# Patient Record
Sex: FEMALE | Race: WHITE | NOT HISPANIC OR LATINO | Employment: OTHER | URBAN - METROPOLITAN AREA
[De-identification: names, ages, dates, MRNs, and addresses within clinical notes are randomized per-mention and may not be internally consistent; named-entity substitution may affect disease eponyms.]

---

## 2023-03-27 ENCOUNTER — NURSING HOME VISIT (OUTPATIENT)
Dept: POST ACUTE CARE | Facility: EXTERNAL LOCATION | Age: 88
End: 2023-03-27
Payer: COMMERCIAL

## 2023-03-27 DIAGNOSIS — F20.0 PARANOID SCHIZOPHRENIA (MULTI): ICD-10-CM

## 2023-03-27 DIAGNOSIS — H91.93 BILATERAL HEARING LOSS, UNSPECIFIED HEARING LOSS TYPE: ICD-10-CM

## 2023-03-27 DIAGNOSIS — H40.9 GLAUCOMA, UNSPECIFIED GLAUCOMA TYPE, UNSPECIFIED LATERALITY: ICD-10-CM

## 2023-03-27 DIAGNOSIS — E56.9 VITAMIN DEFICIENCY: ICD-10-CM

## 2023-03-27 DIAGNOSIS — E03.9 HYPOTHYROIDISM, UNSPECIFIED TYPE: ICD-10-CM

## 2023-03-27 DIAGNOSIS — Z91.81 HISTORY OF FALL: ICD-10-CM

## 2023-03-27 DIAGNOSIS — E44.0 MODERATE PROTEIN-CALORIE MALNUTRITION (MULTI): ICD-10-CM

## 2023-03-27 PROCEDURE — 99309 SBSQ NF CARE MODERATE MDM 30: CPT | Performed by: INTERNAL MEDICINE

## 2023-03-27 NOTE — LETTER
Patient: Ama Pierce  : 1930    Encounter Date: 2023    Name: Ama Pierce  : 1930  MRN: [id number]  Visit Date: 3/27/2023  Chief Complaint: Monthly visit for management of chronic disease    HPI: 89 y/o female who was admitted to geropsych unit at [facility] for increased delusions, significant weight loss, paranoia disorganization with concern for her safety due to lack of support. She remained on geropsych unit and was closely monitored, meds were adjusted and once she was stable. Psych rec 24 hour supervision, guardianship, supervised medication administration. Therefore, she was transferred to [facility] to reside on secured dementia unit to ensure safety, appropriate medical management.    Subjective: Seen and examined today. Laying in bed. Offers no complaints. Nursing reports no issues. Denies N/V/D/C/CP. No fever or chills. I have reviewed nursing notes since my last visit and document any significant changes Reviewed orders, medications, Labs. Reviewed and updated Interval hx and meds reviewed. Reviewed chart looking at current medications, treatments, labs, x-rays etc.     ROS:  As above in subjective. Otherwise, all other systems have been reviewed and are negative for complaint.    Medications:  Medications reviewed and verified in NH chart.     Allergies:  Ephedrine, Lipitor     Vital Signs: Reviewed in [facility]    Physical Exam:  Constitional: Elderly female pt. awake/alert x1-2, no distress. Very pleasant  Eye: EOMI, clear sclera  ENMT: mucous membranes moist, no apparent injury, no lesions seen  Head/Neck: Neck supple, no apparent injury, thyroid without mass or tenderness. No JVD, trachea midline, no bruits  Respiratory/Thorax: Patent airways, CTAB, normal breath sounds with good chest expansion, thorax symmetric  Cardiovascular: Regular rate and rhythm, no murmurs, 2+ equal pulses of the extremites, normal S1 & S2  Gastrointestional: Nondistended, soft, non tender, no rebound  tenderness or guarding, no masses palpable, no organomegaly, +BS  Genitourinary: Deferred  Musculoskeletal: ROM intact, no joint swelling  Extremities: GARCIA equally, peripheral pulses intact, no edema.   Neurological: Intact senses, motor, response and reflexes, normal strength  Lymphatic: No significant lymphadenopathy  Psychological: Appropriate mood and behavior   Skin: Warm and dry, no lesions, no rashes.    Results/Data:   Lab Results   Component Value Date    WBC 7.7 01/26/2020    HGB 13.1 01/26/2020    HCT 42.1 01/26/2020     01/26/2020    ALT 21 07/23/2018    AST 53 (H) 07/23/2018     01/26/2020    K 3.4 (L) 01/26/2020     01/26/2020    CREATININE 0.92 01/26/2020    BUN 24 (H) 01/26/2020    CO2 27 01/26/2020    TSH 1.06 07/23/2018    INR 1.0 01/26/2020       Provider Impression:   Paranoid Schizophrenia   - reside in secured dementia unit.  - No new issues or behaviors noted or reported.   - Psych is following, med adjustments per them (not on anything currently)    Hypothyroidism  - c/w Synthroid.   - Monitor TSH free T4 q6m and PRN    H/o falls  #s/P fall with left radial fracture in January 2020  - Healed    [place]  - hearing aides in place    Vitamin Deficiency  - c/w supplementation    Glaucoma  - c/w medicated eye drops    Fluid/electrolyte/nutrition - Moderate Protein Calorie Malnutrition  - monitor weight  - continue with daily KcL 10meq  - continue monitor PO intake closely.   - dietician following  - labwork q6mths     Code Status  - DNRCCA    Disposition  - Pt resides on a secured memory care unit.  ----------------  Written by Marci Tan RN acting as a scribe for Dr. Santacruz. This note accurately reflects the work and decisions made by Dr. Santacruz.     I, Dr. Santacruz, attest all medical record entries made by the scribe were under my direction and were personally dictated by me. I have reviewed the chart and agree that the record accurately reflects my performance of the history,  physical exam, and assessment and plan.       Electronically Signed By: Crista Castanon MD   5/10/23  1:28 PM

## 2023-05-04 ENCOUNTER — NURSING HOME VISIT (OUTPATIENT)
Dept: POST ACUTE CARE | Facility: EXTERNAL LOCATION | Age: 88
End: 2023-05-04
Payer: COMMERCIAL

## 2023-05-04 DIAGNOSIS — H40.9 GLAUCOMA, UNSPECIFIED GLAUCOMA TYPE, UNSPECIFIED LATERALITY: ICD-10-CM

## 2023-05-04 DIAGNOSIS — E56.9 VITAMIN DEFICIENCY: ICD-10-CM

## 2023-05-04 DIAGNOSIS — H91.93 BILATERAL HEARING LOSS, UNSPECIFIED HEARING LOSS TYPE: ICD-10-CM

## 2023-05-04 DIAGNOSIS — E44.0 MODERATE PROTEIN-CALORIE MALNUTRITION (MULTI): ICD-10-CM

## 2023-05-04 DIAGNOSIS — E03.9 HYPOTHYROIDISM, UNSPECIFIED TYPE: ICD-10-CM

## 2023-05-04 DIAGNOSIS — Z91.81 HISTORY OF FALL: ICD-10-CM

## 2023-05-04 DIAGNOSIS — F20.0 PARANOID SCHIZOPHRENIA (MULTI): ICD-10-CM

## 2023-05-04 PROCEDURE — 99308 SBSQ NF CARE LOW MDM 20: CPT | Performed by: INTERNAL MEDICINE

## 2023-05-04 NOTE — LETTER
Patient: Ama Pierce  : 1930    Encounter Date: 2023    Name: Ama Pierce  : 1930  MRN: [id number]  Visit Date: 2023  Chief Complaint: Monthly visit for management of chronic disease    HPI: 89 y/o female who was admitted to geropsych unit at [facility] for increased delusions, significant weight loss, paranoia disorganization with concern for her safety due to lack of support. She remained on geropsych unit and was closely monitored, meds were adjusted and once she was stable. Psych rec 24 hour supervision, guardianship, supervised medication administration. Therefore, she was transferred to [facility] to reside on secured dementia unit to ensure safety, appropriate medical management.    Subjective: Seen and examined today. Laying in bed, sleeping. Arousable. Offers no complaints. Nursing reports no issues. Denies N/V/D/C/CP. No fever or chills. I have reviewed nursing notes since my last visit and document any significant changes Reviewed orders, medications, Labs. Reviewed and updated Interval hx and meds reviewed. Reviewed chart looking at current medications, treatments, labs, x-rays etc.     ROS:  As above in subjective. Otherwise, all other systems have been reviewed and are negative for complaint.    Medications:  Medications reviewed and verified in NH chart.     Allergies:  Ephedrine, Lipitor     Vital Signs: Reviewed in [facility]    Physical Exam:  Constitional: Elderly female pt. awake/alert x1-2, no distress. Very pleasant  Eye: EOMI, clear sclera  ENMT: mucous membranes moist, no apparent injury, no lesions seen  Head/Neck: Neck supple, no apparent injury, thyroid without mass or tenderness. No JVD, trachea midline, no bruits  Respiratory/Thorax: Patent airways, CTAB, normal breath sounds with good chest expansion, thorax symmetric  Cardiovascular: Regular rate and rhythm, no murmurs, 2+ equal pulses of the extremites, normal S1 & S2  Gastrointestional: Nondistended, soft, non  tender, no rebound tenderness or guarding, no masses palpable, no organomegaly, +BS  Genitourinary: Deferred  Musculoskeletal: ROM intact, no joint swelling  Extremities: GARCIA equally, peripheral pulses intact, no edema.   Neurological: Intact senses, motor, response and reflexes, normal strength  Lymphatic: No significant lymphadenopathy  Psychological: Appropriate mood and behavior   Skin: Warm and dry, no lesions, no rashes.    Results/Data:   Lab Results   Component Value Date    WBC 7.7 01/26/2020    HGB 13.1 01/26/2020    HCT 42.1 01/26/2020     01/26/2020    ALT 21 07/23/2018    AST 53 (H) 07/23/2018     01/26/2020    K 3.4 (L) 01/26/2020     01/26/2020    CREATININE 0.92 01/26/2020    BUN 24 (H) 01/26/2020    CO2 27 01/26/2020    TSH 1.06 07/23/2018    INR 1.0 01/26/2020       Provider Impression:   Paranoid Schizophrenia   - reside in secured dementia unit.  - No new issues or behaviors noted or reported.   - Psych is following, med adjustments per them (not on anything currently)    Hypothyroidism  - c/w Synthroid.   - Monitor TSH free T4 q6m and PRN    H/o falls  #s/P fall with left radial fracture in January 2020  - Healed    [place]  - hearing aides in place    Vitamin Deficiency  - c/w supplementation    Glaucoma  - c/w medicated eye drops    Fluid/electrolyte/nutrition - Moderate Protein Calorie Malnutrition  - monitor weight  - continue with daily KcL 10meq  - continue monitor PO intake closely.   - dietician following  - labwork q6mths     Code Status  - DNRCCA    Disposition  - Pt resides on a secured memory care unit.  ----------------  Written by Marci Tan RN acting as a scribe for Dr. Santacruz. This note accurately reflects the work and decisions made by Dr. Santacruz.     I, Dr. Santacruz, attest all medical record entries made by the scribe were under my direction and were personally dictated by me. I have reviewed the chart and agree that the record accurately reflects my performance of  the history, physical exam, and assessment and plan.     Electronically Signed By: Crista Castanon MD   5/16/23  9:04 AM

## 2023-05-10 PROBLEM — F20.0 PARANOID SCHIZOPHRENIA (MULTI): Status: ACTIVE | Noted: 2023-05-10

## 2023-05-10 PROBLEM — H91.93 BILATERAL HEARING LOSS: Status: ACTIVE | Noted: 2023-05-10

## 2023-05-10 PROBLEM — E56.9 VITAMIN DEFICIENCY: Status: ACTIVE | Noted: 2023-05-10

## 2023-05-10 PROBLEM — E03.9 HYPOTHYROIDISM: Status: ACTIVE | Noted: 2023-05-10

## 2023-05-10 PROBLEM — H40.9 GLAUCOMA: Status: ACTIVE | Noted: 2023-05-10

## 2023-05-10 PROBLEM — Z91.81 HISTORY OF FALL: Status: ACTIVE | Noted: 2023-05-10

## 2023-05-10 PROBLEM — E44.0 MODERATE PROTEIN-CALORIE MALNUTRITION (MULTI): Status: ACTIVE | Noted: 2023-05-10

## 2023-05-10 NOTE — PROGRESS NOTES
Name: Ama Pierce  : 1930  MRN: [id number]  Visit Date: 3/27/2023  Chief Complaint: Monthly visit for management of chronic disease    HPI: 89 y/o female who was admitted to geropsych unit at [facility] for increased delusions, significant weight loss, paranoia disorganization with concern for her safety due to lack of support. She remained on geropsych unit and was closely monitored, meds were adjusted and once she was stable. Psych rec 24 hour supervision, guardianship, supervised medication administration. Therefore, she was transferred to [facility] to reside on secured dementia unit to ensure safety, appropriate medical management.    Subjective: Seen and examined today. Laying in bed. Offers no complaints. Nursing reports no issues. Denies N/V/D/C/CP. No fever or chills. I have reviewed nursing notes since my last visit and document any significant changes Reviewed orders, medications, Labs. Reviewed and updated Interval hx and meds reviewed. Reviewed chart looking at current medications, treatments, labs, x-rays etc.     ROS:  As above in subjective. Otherwise, all other systems have been reviewed and are negative for complaint.    Medications:  Medications reviewed and verified in NH chart.     Allergies:  Ephedrine, Lipitor     Vital Signs: Reviewed in [facility]    Physical Exam:  Constitional: Elderly female pt. awake/alert x1-2, no distress. Very pleasant  Eye: EOMI, clear sclera  ENMT: mucous membranes moist, no apparent injury, no lesions seen  Head/Neck: Neck supple, no apparent injury, thyroid without mass or tenderness. No JVD, trachea midline, no bruits  Respiratory/Thorax: Patent airways, CTAB, normal breath sounds with good chest expansion, thorax symmetric  Cardiovascular: Regular rate and rhythm, no murmurs, 2+ equal pulses of the extremites, normal S1 & S2  Gastrointestional: Nondistended, soft, non tender, no rebound tenderness or guarding, no masses palpable, no organomegaly,  +BS  Genitourinary: Deferred  Musculoskeletal: ROM intact, no joint swelling  Extremities: GARCIA equally, peripheral pulses intact, no edema.   Neurological: Intact senses, motor, response and reflexes, normal strength  Lymphatic: No significant lymphadenopathy  Psychological: Appropriate mood and behavior   Skin: Warm and dry, no lesions, no rashes.    Results/Data:   Lab Results   Component Value Date    WBC 7.7 01/26/2020    HGB 13.1 01/26/2020    HCT 42.1 01/26/2020     01/26/2020    ALT 21 07/23/2018    AST 53 (H) 07/23/2018     01/26/2020    K 3.4 (L) 01/26/2020     01/26/2020    CREATININE 0.92 01/26/2020    BUN 24 (H) 01/26/2020    CO2 27 01/26/2020    TSH 1.06 07/23/2018    INR 1.0 01/26/2020       Provider Impression:   Paranoid Schizophrenia   - reside in secured dementia unit.  - No new issues or behaviors noted or reported.   - Psych is following, med adjustments per them (not on anything currently)    Hypothyroidism  - c/w Synthroid.   - Monitor TSH free T4 q6m and PRN    H/o falls  #s/P fall with left radial fracture in January 2020  - Healed    [place]  - hearing aides in place    Vitamin Deficiency  - c/w supplementation    Glaucoma  - c/w medicated eye drops    Fluid/electrolyte/nutrition - Moderate Protein Calorie Malnutrition  - monitor weight  - continue with daily KcL 10meq  - continue monitor PO intake closely.   - dietician following  - labwork q6mths     Code Status  - DNRCCA    Disposition  - Pt resides on a secured memory care unit.  ----------------  Written by Marci Tan RN acting as a scribe for Dr. Santacruz. This note accurately reflects the work and decisions made by Dr. Santacruz.     I, Dr. Santacruz, attest all medical record entries made by the scribe were under my direction and were personally dictated by me. I have reviewed the chart and agree that the record accurately reflects my performance of the history, physical exam, and assessment and plan.

## 2023-05-15 NOTE — PROGRESS NOTES
Name: Ama Pierce  : 1930  MRN: [id number]  Visit Date: 2023  Chief Complaint: Monthly visit for management of chronic disease    HPI: 89 y/o female who was admitted to geropsych unit at [facility] for increased delusions, significant weight loss, paranoia disorganization with concern for her safety due to lack of support. She remained on geropsych unit and was closely monitored, meds were adjusted and once she was stable. Psych rec 24 hour supervision, guardianship, supervised medication administration. Therefore, she was transferred to [facility] to reside on secured dementia unit to ensure safety, appropriate medical management.    Subjective: Seen and examined today. Laying in bed, sleeping. Arousable. Offers no complaints. Nursing reports no issues. Denies N/V/D/C/CP. No fever or chills. I have reviewed nursing notes since my last visit and document any significant changes Reviewed orders, medications, Labs. Reviewed and updated Interval hx and meds reviewed. Reviewed chart looking at current medications, treatments, labs, x-rays etc.     ROS:  As above in subjective. Otherwise, all other systems have been reviewed and are negative for complaint.    Medications:  Medications reviewed and verified in NH chart.     Allergies:  Ephedrine, Lipitor     Vital Signs: Reviewed in [facility]    Physical Exam:  Constitional: Elderly female pt. awake/alert x1-2, no distress. Very pleasant  Eye: EOMI, clear sclera  ENMT: mucous membranes moist, no apparent injury, no lesions seen  Head/Neck: Neck supple, no apparent injury, thyroid without mass or tenderness. No JVD, trachea midline, no bruits  Respiratory/Thorax: Patent airways, CTAB, normal breath sounds with good chest expansion, thorax symmetric  Cardiovascular: Regular rate and rhythm, no murmurs, 2+ equal pulses of the extremites, normal S1 & S2  Gastrointestional: Nondistended, soft, non tender, no rebound tenderness or guarding, no masses palpable, no  organomegaly, +BS  Genitourinary: Deferred  Musculoskeletal: ROM intact, no joint swelling  Extremities: GARCIA equally, peripheral pulses intact, no edema.   Neurological: Intact senses, motor, response and reflexes, normal strength  Lymphatic: No significant lymphadenopathy  Psychological: Appropriate mood and behavior   Skin: Warm and dry, no lesions, no rashes.    Results/Data:   Lab Results   Component Value Date    WBC 7.7 01/26/2020    HGB 13.1 01/26/2020    HCT 42.1 01/26/2020     01/26/2020    ALT 21 07/23/2018    AST 53 (H) 07/23/2018     01/26/2020    K 3.4 (L) 01/26/2020     01/26/2020    CREATININE 0.92 01/26/2020    BUN 24 (H) 01/26/2020    CO2 27 01/26/2020    TSH 1.06 07/23/2018    INR 1.0 01/26/2020       Provider Impression:   Paranoid Schizophrenia   - reside in secured dementia unit.  - No new issues or behaviors noted or reported.   - Psych is following, med adjustments per them (not on anything currently)    Hypothyroidism  - c/w Synthroid.   - Monitor TSH free T4 q6m and PRN    H/o falls  #s/P fall with left radial fracture in January 2020  - Healed    [place]  - hearing aides in place    Vitamin Deficiency  - c/w supplementation    Glaucoma  - c/w medicated eye drops    Fluid/electrolyte/nutrition - Moderate Protein Calorie Malnutrition  - monitor weight  - continue with daily KcL 10meq  - continue monitor PO intake closely.   - dietician following  - labwork q6mths     Code Status  - DNRCCA    Disposition  - Pt resides on a secured memory care unit.  ----------------  Written by Marci Tan RN acting as a scribe for Dr. Santacruz. This note accurately reflects the work and decisions made by Dr. Santacruz.     I, Dr. Santacruz, attest all medical record entries made by the scribe were under my direction and were personally dictated by me. I have reviewed the chart and agree that the record accurately reflects my performance of the history, physical exam, and assessment and plan.

## 2023-07-15 ENCOUNTER — NURSING HOME VISIT (OUTPATIENT)
Dept: POST ACUTE CARE | Facility: EXTERNAL LOCATION | Age: 88
End: 2023-07-15
Payer: COMMERCIAL

## 2023-07-15 DIAGNOSIS — E56.9 VITAMIN DEFICIENCY: ICD-10-CM

## 2023-07-15 DIAGNOSIS — H40.9 GLAUCOMA, UNSPECIFIED GLAUCOMA TYPE, UNSPECIFIED LATERALITY: ICD-10-CM

## 2023-07-15 DIAGNOSIS — E03.9 HYPOTHYROIDISM, UNSPECIFIED TYPE: ICD-10-CM

## 2023-07-15 DIAGNOSIS — E44.0 MODERATE PROTEIN-CALORIE MALNUTRITION (MULTI): ICD-10-CM

## 2023-07-15 DIAGNOSIS — F20.0 PARANOID SCHIZOPHRENIA (MULTI): ICD-10-CM

## 2023-07-15 DIAGNOSIS — Z91.81 HISTORY OF FALL: ICD-10-CM

## 2023-07-15 DIAGNOSIS — H91.93 BILATERAL HEARING LOSS, UNSPECIFIED HEARING LOSS TYPE: ICD-10-CM

## 2023-07-15 PROCEDURE — 99308 SBSQ NF CARE LOW MDM 20: CPT | Performed by: INTERNAL MEDICINE

## 2023-07-15 NOTE — LETTER
Patient: Ama Pierce  : 1930    Encounter Date: 07/15/2023    Name: Ama Pierce  : 1930  MRN: [id number]  Visit Date: 7/15/2023  Chief Complaint: Monthly visit for management of chronic disease    HPI: 89 y/o female who was admitted to geropsych unit at [facility] for increased delusions, significant weight loss, paranoia disorganization with concern for her safety due to lack of support. She remained on geropsych unit and was closely monitored, meds were adjusted and once she was stable. Psych rec 24 hour supervision, guardianship, supervised medication administration. Therefore, she was transferred to [facility] to reside on secured dementia unit to ensure safety, appropriate medical management.    Subjective: Seen and examined today. Sitting up in chair in the common area. Appears happy to see me. Offers no complaints. Nursing reports no issues. Denies N/V/D/C/CP. No fever or chills. I have reviewed nursing notes since my last visit and document any significant changes Reviewed orders, medications, Labs. Reviewed and updated Interval hx and meds reviewed. Reviewed chart looking at current medications, treatments, labs, x-rays etc.     ROS:  As above in subjective. Otherwise, all other systems have been reviewed and are negative for complaint.    Medications:  Medications reviewed and verified in NH chart.     Allergies:  Ephedrine, Lipitor     Vital Signs: Reviewed in [facility]    Physical Exam:  Constitional: Elderly female pt. awake/alert x1-2, no distress. Very pleasant  Eye: EOMI, clear sclera  ENMT: mucous membranes moist, no apparent injury, no lesions seen  Head/Neck: Neck supple, no apparent injury, thyroid without mass or tenderness. No JVD, trachea midline, no bruits  Respiratory/Thorax: Patent airways, CTAB, normal breath sounds with good chest expansion, thorax symmetric  Cardiovascular: Regular rate and rhythm, no murmurs, 2+ equal pulses of the extremites, normal S1 &  S2  Gastrointestional: Nondistended, soft, non tender, no rebound tenderness or guarding, no masses palpable, no organomegaly, +BS  Genitourinary: Deferred  Musculoskeletal: ROM intact, no joint swelling  Extremities: GARCIA equally, peripheral pulses intact, no edema.   Neurological: Intact senses, motor, response and reflexes, normal strength  Lymphatic: No significant lymphadenopathy  Psychological: Appropriate mood and behavior   Skin: Warm and dry, no lesions, no rashes.    Results/Data:   Lab Results   Component Value Date    WBC 7.7 01/26/2020    HGB 13.1 01/26/2020    HCT 42.1 01/26/2020     01/26/2020    ALT 21 07/23/2018    AST 53 (H) 07/23/2018     01/26/2020    K 3.4 (L) 01/26/2020     01/26/2020    CREATININE 0.92 01/26/2020    BUN 24 (H) 01/26/2020    CO2 27 01/26/2020    TSH 1.06 07/23/2018    INR 1.0 01/26/2020       Provider Impression:   Paranoid Schizophrenia   - reside in secured dementia unit.  - No new issues or behaviors noted or reported.   - Psych is following, med adjustments per them (not on anything currently)    Hypothyroidism  - c/w Synthroid.   - Monitor TSH free T4 q6m and PRN    H/o falls  #s/P fall with left radial fracture in January 2020  - Healed    [place]  - hearing aides in place    Vitamin Deficiency  - c/w supplementation    Glaucoma  - c/w medicated eye drops    Fluid/electrolyte/nutrition - Moderate Protein Calorie Malnutrition  - monitor weight  - continue with daily KcL 10meq  - continue monitor PO intake closely.   - dietician following  - labwork q6mths     Code Status  - DNRCCA    Disposition  - Pt resides on a secured memory care unit.  ----------------  Written by Marci Tan RN acting as a scribe for Dr. Santacruz. This note accurately reflects the work and decisions made by Dr. Santacruz.     I, Dr. Santacruz, attest all medical record entries made by the scribe were under my direction and were personally dictated by me. I have reviewed the chart and agree that  the record accurately reflects my performance of the history, physical exam, and assessment and plan.     Electronically Signed By: Crista Castanon MD   9/6/23  2:45 PM

## 2023-09-06 PROBLEM — Z78.9 NURSING HOME RESIDENT: Status: ACTIVE | Noted: 2023-09-06

## 2023-09-06 NOTE — PROGRESS NOTES
Name: Ama Pierce  : 1930  MRN: [id number]  Visit Date: 7/15/2023  Chief Complaint: Monthly visit for management of chronic disease    HPI: 89 y/o female who was admitted to geropsych unit at [facility] for increased delusions, significant weight loss, paranoia disorganization with concern for her safety due to lack of support. She remained on geropsych unit and was closely monitored, meds were adjusted and once she was stable. Psych rec 24 hour supervision, guardianship, supervised medication administration. Therefore, she was transferred to [facility] to reside on secured dementia unit to ensure safety, appropriate medical management.    Subjective: Seen and examined today. Sitting up in chair in the common area. Appears happy to see me. Offers no complaints. Nursing reports no issues. Denies N/V/D/C/CP. No fever or chills. I have reviewed nursing notes since my last visit and document any significant changes Reviewed orders, medications, Labs. Reviewed and updated Interval hx and meds reviewed. Reviewed chart looking at current medications, treatments, labs, x-rays etc.     ROS:  As above in subjective. Otherwise, all other systems have been reviewed and are negative for complaint.    Medications:  Medications reviewed and verified in NH chart.     Allergies:  Ephedrine, Lipitor     Vital Signs: Reviewed in [facility]    Physical Exam:  Constitional: Elderly female pt. awake/alert x1-2, no distress. Very pleasant  Eye: EOMI, clear sclera  ENMT: mucous membranes moist, no apparent injury, no lesions seen  Head/Neck: Neck supple, no apparent injury, thyroid without mass or tenderness. No JVD, trachea midline, no bruits  Respiratory/Thorax: Patent airways, CTAB, normal breath sounds with good chest expansion, thorax symmetric  Cardiovascular: Regular rate and rhythm, no murmurs, 2+ equal pulses of the extremites, normal S1 & S2  Gastrointestional: Nondistended, soft, non tender, no rebound tenderness or  guarding, no masses palpable, no organomegaly, +BS  Genitourinary: Deferred  Musculoskeletal: ROM intact, no joint swelling  Extremities: GARCIA equally, peripheral pulses intact, no edema.   Neurological: Intact senses, motor, response and reflexes, normal strength  Lymphatic: No significant lymphadenopathy  Psychological: Appropriate mood and behavior   Skin: Warm and dry, no lesions, no rashes.    Results/Data:   Lab Results   Component Value Date    WBC 7.7 01/26/2020    HGB 13.1 01/26/2020    HCT 42.1 01/26/2020     01/26/2020    ALT 21 07/23/2018    AST 53 (H) 07/23/2018     01/26/2020    K 3.4 (L) 01/26/2020     01/26/2020    CREATININE 0.92 01/26/2020    BUN 24 (H) 01/26/2020    CO2 27 01/26/2020    TSH 1.06 07/23/2018    INR 1.0 01/26/2020       Provider Impression:   Paranoid Schizophrenia   - reside in secured dementia unit.  - No new issues or behaviors noted or reported.   - Psych is following, med adjustments per them (not on anything currently)    Hypothyroidism  - c/w Synthroid.   - Monitor TSH free T4 q6m and PRN    H/o falls  #s/P fall with left radial fracture in January 2020  - Healed    [place]  - hearing aides in place    Vitamin Deficiency  - c/w supplementation    Glaucoma  - c/w medicated eye drops    Fluid/electrolyte/nutrition - Moderate Protein Calorie Malnutrition  - monitor weight  - continue with daily KcL 10meq  - continue monitor PO intake closely.   - dietician following  - labwork q6mths     Code Status  - DNRCCA    Disposition  - Pt resides on a secured memory care unit.  ----------------  Written by Marci Tan RN acting as a scribe for Dr. Santacruz. This note accurately reflects the work and decisions made by Dr. Santacruz.     I, Dr. Santacruz, attest all medical record entries made by the scribe were under my direction and were personally dictated by me. I have reviewed the chart and agree that the record accurately reflects my performance of the history, physical exam, and  assessment and plan.

## 2023-09-09 ENCOUNTER — NURSING HOME VISIT (OUTPATIENT)
Dept: POST ACUTE CARE | Facility: EXTERNAL LOCATION | Age: 88
End: 2023-09-09
Payer: COMMERCIAL

## 2023-09-09 DIAGNOSIS — H91.93 BILATERAL HEARING LOSS, UNSPECIFIED HEARING LOSS TYPE: ICD-10-CM

## 2023-09-09 DIAGNOSIS — E03.9 HYPOTHYROIDISM, UNSPECIFIED TYPE: ICD-10-CM

## 2023-09-09 DIAGNOSIS — E56.9 VITAMIN DEFICIENCY: ICD-10-CM

## 2023-09-09 DIAGNOSIS — E44.0 MODERATE PROTEIN-CALORIE MALNUTRITION (MULTI): ICD-10-CM

## 2023-09-09 DIAGNOSIS — H40.9 GLAUCOMA, UNSPECIFIED GLAUCOMA TYPE, UNSPECIFIED LATERALITY: ICD-10-CM

## 2023-09-09 DIAGNOSIS — F20.0 PARANOID SCHIZOPHRENIA (MULTI): ICD-10-CM

## 2023-09-09 DIAGNOSIS — Z91.81 HISTORY OF FALL: ICD-10-CM

## 2023-09-09 PROCEDURE — 99308 SBSQ NF CARE LOW MDM 20: CPT | Performed by: INTERNAL MEDICINE

## 2023-09-09 NOTE — LETTER
Patient: Ama Pierce  : 1930    Encounter Date: 2023    Name: Ama Pierce  : 1930  MRN: [id number]  Visit Date: 2023  Chief Complaint: Monthly visit for management of chronic disease    HPI: 89 y/o female who was admitted to geropsych unit at [facility] for increased delusions, significant weight loss, paranoia disorganization with concern for her safety due to lack of support. She remained on geropsych unit and was closely monitored, meds were adjusted and once she was stable. Psych rec 24 hour supervision, guardianship, supervised medication administration. Therefore, she was transferred to [facility] to reside on secured dementia unit to ensure safety, appropriate medical management.    Subjective: Seen and examined today. Sitting up in chair in the common area. Offers no complaints. Nursing reports no issues. Denies N/V/D/C/CP. No fever or chills. I have reviewed nursing notes since my last visit and document any significant changes Reviewed orders, medications, Labs. Reviewed and updated Interval hx and meds reviewed. Reviewed chart looking at current medications, treatments, labs, x-rays etc.     ROS:  As above in subjective. Otherwise, all other systems have been reviewed and are negative for complaint.    Medications:  Medications reviewed and verified in NH chart.     Vital Signs: Reviewed in [facility]    Physical Exam:  Constitional: Elderly female pt. awake/alert x1-2, no distress. Very pleasant  Eye: EOMI, clear sclera  ENMT: mucous membranes moist, no apparent injury, no lesions seen  Head/Neck: Neck supple, no apparent injury, thyroid without mass or tenderness. No JVD, trachea midline, no bruits  Respiratory/Thorax: Patent airways, CTAB, normal breath sounds with good chest expansion, thorax symmetric  Cardiovascular: Regular rate and rhythm, no murmurs, 2+ equal pulses of the extremites, normal S1 & S2  Gastrointestional: Nondistended, soft, non tender, no rebound tenderness or  guarding, no masses palpable, no organomegaly, +BS  Genitourinary: Deferred  Musculoskeletal: ROM intact, no joint swelling  Extremities: GARCIA equally, peripheral pulses intact, no edema.   Neurological: Intact senses, motor, response and reflexes, normal strength  Lymphatic: No significant lymphadenopathy  Psychological: Appropriate mood and behavior   Skin: Warm and dry, no lesions, no rashes.    Results/Data:   Lab Results   Component Value Date    WBC 7.7 01/26/2020    HGB 13.1 01/26/2020    HCT 42.1 01/26/2020     01/26/2020    ALT 21 07/23/2018    AST 53 (H) 07/23/2018     01/26/2020    K 3.4 (L) 01/26/2020     01/26/2020    CREATININE 0.92 01/26/2020    BUN 24 (H) 01/26/2020    CO2 27 01/26/2020    TSH 1.06 07/23/2018    INR 1.0 01/26/2020       Provider Impression:   Paranoid Schizophrenia   - reside in secured dementia unit.  - No new issues or behaviors noted or reported.   - Psych is following, med adjustments per them (not on anything currently)    Hypothyroidism  - c/w Synthroid.   - Monitor TSH free T4 q6m and PRN    H/o falls  #s/P fall with left radial fracture in January 2020  - Healed    [place]  - hearing aides in place    Vitamin Deficiency  - c/w supplementation    Glaucoma  - c/w medicated eye drops    Fluid/electrolyte/nutrition - Moderate Protein Calorie Malnutrition  - monitor weight  - continue with daily KcL 10meq  - continue monitor PO intake closely.   - dietician following  - labwork q6mths     Code Status  - DNRCCA    Disposition  - Pt resides on a secured memory care unit.  ----------------  Written by Marci Tan RN acting as a scribe for Dr. Santacruz. This note accurately reflects the work and decisions made by Dr. Santacruz.     I, Dr. Santacruz, attest all medical record entries made by the scribe were under my direction and were personally dictated by me. I have reviewed the chart and agree that the record accurately reflects my performance of the history, physical exam, and  assessment and plan.       Electronically Signed By: Crista Castanon MD   1/5/24  1:00 PM

## 2023-11-04 ENCOUNTER — NURSING HOME VISIT (OUTPATIENT)
Dept: POST ACUTE CARE | Facility: EXTERNAL LOCATION | Age: 88
End: 2023-11-04
Payer: COMMERCIAL

## 2023-11-04 DIAGNOSIS — H40.9 GLAUCOMA, UNSPECIFIED GLAUCOMA TYPE, UNSPECIFIED LATERALITY: ICD-10-CM

## 2023-11-04 DIAGNOSIS — H91.93 BILATERAL HEARING LOSS, UNSPECIFIED HEARING LOSS TYPE: ICD-10-CM

## 2023-11-04 DIAGNOSIS — E56.9 VITAMIN DEFICIENCY: ICD-10-CM

## 2023-11-04 DIAGNOSIS — F20.0 PARANOID SCHIZOPHRENIA (MULTI): ICD-10-CM

## 2023-11-04 DIAGNOSIS — Z91.81 HISTORY OF FALL: ICD-10-CM

## 2023-11-04 DIAGNOSIS — E03.9 HYPOTHYROIDISM, UNSPECIFIED TYPE: ICD-10-CM

## 2023-11-04 DIAGNOSIS — E44.0 MODERATE PROTEIN-CALORIE MALNUTRITION (MULTI): ICD-10-CM

## 2023-11-04 PROCEDURE — 99308 SBSQ NF CARE LOW MDM 20: CPT | Performed by: INTERNAL MEDICINE

## 2023-11-04 NOTE — LETTER
Patient: Ama Pierce  : 1930    Encounter Date: 2023    Name: Ama Pierce  : 1930  MRN: [id number]  Visit Date: 2023  Chief Complaint: Monthly visit for management of chronic disease    HPI: 89 y/o female who was admitted to geropsych unit at [facility] for increased delusions, significant weight loss, paranoia disorganization with concern for her safety due to lack of support. She remained on geropsych unit and was closely monitored, meds were adjusted and once she was stable. Psych rec 24 hour supervision, guardianship, supervised medication administration. Therefore, she was transferred to [facility] to reside on secured dementia unit to ensure safety, appropriate medical management.    Subjective: Seen and examined today. Sitting up in chair in the common area. Offers no complaints. Nursing reports no issues. Denies N/V/D/C/CP. No fever or chills. I have reviewed nursing notes since my last visit and document any significant changes Reviewed orders, medications, Labs. Reviewed and updated Interval hx and meds reviewed. Reviewed chart looking at current medications, treatments, labs, x-rays etc.     ROS:  As above in subjective. Otherwise, all other systems have been reviewed and are negative for complaint.    Medications:  Medications reviewed and verified in NH chart.     Vital Signs: Reviewed in [facility]    Physical Exam:  Constitional: Elderly female pt. awake/alert x1-2, no distress. Very pleasant  Eye: EOMI, clear sclera  ENMT: mucous membranes moist, no apparent injury, no lesions seen  Head/Neck: Neck supple, no apparent injury, thyroid without mass or tenderness. No JVD, trachea midline, no bruits  Respiratory/Thorax: Patent airways, CTAB, normal breath sounds with good chest expansion, thorax symmetric  Cardiovascular: Regular rate and rhythm, no murmurs, 2+ equal pulses of the extremites, normal S1 & S2  Gastrointestional: Nondistended, soft, non tender, no rebound tenderness  or guarding, no masses palpable, no organomegaly, +BS  Genitourinary: Deferred  Musculoskeletal: ROM intact, no joint swelling  Extremities: GARCIA equally, peripheral pulses intact, no edema.   Neurological: Intact senses, motor, response and reflexes, normal strength  Lymphatic: No significant lymphadenopathy  Psychological: Appropriate mood and behavior   Skin: Warm and dry, no lesions, no rashes.    Results/Data:   Lab Results   Component Value Date    WBC 7.7 01/26/2020    HGB 13.1 01/26/2020    HCT 42.1 01/26/2020     01/26/2020    ALT 21 07/23/2018    AST 53 (H) 07/23/2018     01/26/2020    K 3.4 (L) 01/26/2020     01/26/2020    CREATININE 0.92 01/26/2020    BUN 24 (H) 01/26/2020    CO2 27 01/26/2020    TSH 1.06 07/23/2018    INR 1.0 01/26/2020       Provider Impression:   Paranoid Schizophrenia   - reside in secured dementia unit.  - No new issues or behaviors noted or reported.   - Psych is following, med adjustments per them (not on anything currently)    Hypothyroidism  - c/w Synthroid.   - Monitor TSH free T4 q6m and PRN    H/o falls  #s/P fall with left radial fracture in January 2020  - Healed    [place]  - hearing aides in place    Vitamin Deficiency  - c/w supplementation    Glaucoma  - c/w medicated eye drops    Fluid/electrolyte/nutrition - Moderate Protein Calorie Malnutrition  - monitor weight  - continue with daily KcL 10meq  - continue monitor PO intake closely.   - dietician following  - labwork q6mths     Code Status  - DNRCCA    Disposition  - Pt resides on a secured memory care unit.  ----------------  Written by Marci Tan RN acting as a scribe for Dr. Santacruz. This note accurately reflects the work and decisions made by Dr. Santacruz.     I, Dr. Santacruz, attest all medical record entries made by the scribe were under my direction and were personally dictated by me. I have reviewed the chart and agree that the record accurately reflects my performance of the history, physical exam,  and assessment and plan.     Electronically Signed By: Crista Castanon MD   1/10/24 11:25 AM

## 2024-01-03 NOTE — PROGRESS NOTES
Name: Ama Pierce  : 1930  MRN: [id number]  Visit Date: 2023  Chief Complaint: Monthly visit for management of chronic disease    HPI: 89 y/o female who was admitted to geropsych unit at [facility] for increased delusions, significant weight loss, paranoia disorganization with concern for her safety due to lack of support. She remained on geropsych unit and was closely monitored, meds were adjusted and once she was stable. Psych rec 24 hour supervision, guardianship, supervised medication administration. Therefore, she was transferred to [facility] to reside on secured dementia unit to ensure safety, appropriate medical management.    Subjective: Seen and examined today. Sitting up in chair in the common area. Offers no complaints. Nursing reports no issues. Denies N/V/D/C/CP. No fever or chills. I have reviewed nursing notes since my last visit and document any significant changes Reviewed orders, medications, Labs. Reviewed and updated Interval hx and meds reviewed. Reviewed chart looking at current medications, treatments, labs, x-rays etc.     ROS:  As above in subjective. Otherwise, all other systems have been reviewed and are negative for complaint.    Medications:  Medications reviewed and verified in NH chart.     Vital Signs: Reviewed in [facility]    Physical Exam:  Constitional: Elderly female pt. awake/alert x1-2, no distress. Very pleasant  Eye: EOMI, clear sclera  ENMT: mucous membranes moist, no apparent injury, no lesions seen  Head/Neck: Neck supple, no apparent injury, thyroid without mass or tenderness. No JVD, trachea midline, no bruits  Respiratory/Thorax: Patent airways, CTAB, normal breath sounds with good chest expansion, thorax symmetric  Cardiovascular: Regular rate and rhythm, no murmurs, 2+ equal pulses of the extremites, normal S1 & S2  Gastrointestional: Nondistended, soft, non tender, no rebound tenderness or guarding, no masses palpable, no organomegaly, +BS  Genitourinary:  Deferred  Musculoskeletal: ROM intact, no joint swelling  Extremities: GARCIA equally, peripheral pulses intact, no edema.   Neurological: Intact senses, motor, response and reflexes, normal strength  Lymphatic: No significant lymphadenopathy  Psychological: Appropriate mood and behavior   Skin: Warm and dry, no lesions, no rashes.    Results/Data:   Lab Results   Component Value Date    WBC 7.7 01/26/2020    HGB 13.1 01/26/2020    HCT 42.1 01/26/2020     01/26/2020    ALT 21 07/23/2018    AST 53 (H) 07/23/2018     01/26/2020    K 3.4 (L) 01/26/2020     01/26/2020    CREATININE 0.92 01/26/2020    BUN 24 (H) 01/26/2020    CO2 27 01/26/2020    TSH 1.06 07/23/2018    INR 1.0 01/26/2020       Provider Impression:   Paranoid Schizophrenia   - reside in secured dementia unit.  - No new issues or behaviors noted or reported.   - Psych is following, med adjustments per them (not on anything currently)    Hypothyroidism  - c/w Synthroid.   - Monitor TSH free T4 q6m and PRN    H/o falls  #s/P fall with left radial fracture in January 2020  - Healed    [place]  - hearing aides in place    Vitamin Deficiency  - c/w supplementation    Glaucoma  - c/w medicated eye drops    Fluid/electrolyte/nutrition - Moderate Protein Calorie Malnutrition  - monitor weight  - continue with daily KcL 10meq  - continue monitor PO intake closely.   - dietician following  - labwork q6mths     Code Status  - DNRCCA    Disposition  - Pt resides on a secured memory care unit.  ----------------  Written by Marci Tan RN acting as a scribe for Dr. Santacruz. This note accurately reflects the work and decisions made by Dr. Santacruz.     I, Dr. Santacruz, attest all medical record entries made by the scribe were under my direction and were personally dictated by me. I have reviewed the chart and agree that the record accurately reflects my performance of the history, physical exam, and assessment and plan.

## 2024-01-09 NOTE — PROGRESS NOTES
Name: Ama Pierce  : 1930  MRN: [id number]  Visit Date: 2023  Chief Complaint: Monthly visit for management of chronic disease    HPI: 89 y/o female who was admitted to geropsych unit at [facility] for increased delusions, significant weight loss, paranoia disorganization with concern for her safety due to lack of support. She remained on geropsych unit and was closely monitored, meds were adjusted and once she was stable. Psych rec 24 hour supervision, guardianship, supervised medication administration. Therefore, she was transferred to [facility] to reside on secured dementia unit to ensure safety, appropriate medical management.    Subjective: Seen and examined today. Sitting up in chair in the common area. Offers no complaints. Nursing reports no issues. Denies N/V/D/C/CP. No fever or chills. I have reviewed nursing notes since my last visit and document any significant changes Reviewed orders, medications, Labs. Reviewed and updated Interval hx and meds reviewed. Reviewed chart looking at current medications, treatments, labs, x-rays etc.     ROS:  As above in subjective. Otherwise, all other systems have been reviewed and are negative for complaint.    Medications:  Medications reviewed and verified in NH chart.     Vital Signs: Reviewed in [facility]    Physical Exam:  Constitional: Elderly female pt. awake/alert x1-2, no distress. Very pleasant  Eye: EOMI, clear sclera  ENMT: mucous membranes moist, no apparent injury, no lesions seen  Head/Neck: Neck supple, no apparent injury, thyroid without mass or tenderness. No JVD, trachea midline, no bruits  Respiratory/Thorax: Patent airways, CTAB, normal breath sounds with good chest expansion, thorax symmetric  Cardiovascular: Regular rate and rhythm, no murmurs, 2+ equal pulses of the extremites, normal S1 & S2  Gastrointestional: Nondistended, soft, non tender, no rebound tenderness or guarding, no masses palpable, no organomegaly, +BS  Genitourinary:  Deferred  Musculoskeletal: ROM intact, no joint swelling  Extremities: GARCIA equally, peripheral pulses intact, no edema.   Neurological: Intact senses, motor, response and reflexes, normal strength  Lymphatic: No significant lymphadenopathy  Psychological: Appropriate mood and behavior   Skin: Warm and dry, no lesions, no rashes.    Results/Data:   Lab Results   Component Value Date    WBC 7.7 01/26/2020    HGB 13.1 01/26/2020    HCT 42.1 01/26/2020     01/26/2020    ALT 21 07/23/2018    AST 53 (H) 07/23/2018     01/26/2020    K 3.4 (L) 01/26/2020     01/26/2020    CREATININE 0.92 01/26/2020    BUN 24 (H) 01/26/2020    CO2 27 01/26/2020    TSH 1.06 07/23/2018    INR 1.0 01/26/2020       Provider Impression:   Paranoid Schizophrenia   - reside in secured dementia unit.  - No new issues or behaviors noted or reported.   - Psych is following, med adjustments per them (not on anything currently)    Hypothyroidism  - c/w Synthroid.   - Monitor TSH free T4 q6m and PRN    H/o falls  #s/P fall with left radial fracture in January 2020  - Healed    [place]  - hearing aides in place    Vitamin Deficiency  - c/w supplementation    Glaucoma  - c/w medicated eye drops    Fluid/electrolyte/nutrition - Moderate Protein Calorie Malnutrition  - monitor weight  - continue with daily KcL 10meq  - continue monitor PO intake closely.   - dietician following  - labwork q6mths     Code Status  - DNRCCA    Disposition  - Pt resides on a secured memory care unit.  ----------------  Written by Marci Tan RN acting as a scribe for Dr. Santacruz. This note accurately reflects the work and decisions made by Dr. Santacruz.     I, Dr. Santacruz, attest all medical record entries made by the scribe were under my direction and were personally dictated by me. I have reviewed the chart and agree that the record accurately reflects my performance of the history, physical exam, and assessment and plan.

## 2024-01-13 ENCOUNTER — NURSING HOME VISIT (OUTPATIENT)
Dept: POST ACUTE CARE | Facility: EXTERNAL LOCATION | Age: 89
End: 2024-01-13
Payer: COMMERCIAL

## 2024-01-13 DIAGNOSIS — Z91.81 HISTORY OF FALL: ICD-10-CM

## 2024-01-13 DIAGNOSIS — H91.93 BILATERAL HEARING LOSS, UNSPECIFIED HEARING LOSS TYPE: ICD-10-CM

## 2024-01-13 DIAGNOSIS — H40.9 GLAUCOMA, UNSPECIFIED GLAUCOMA TYPE, UNSPECIFIED LATERALITY: ICD-10-CM

## 2024-01-13 DIAGNOSIS — F20.0 PARANOID SCHIZOPHRENIA (MULTI): ICD-10-CM

## 2024-01-13 DIAGNOSIS — E44.0 MODERATE PROTEIN-CALORIE MALNUTRITION (MULTI): ICD-10-CM

## 2024-01-13 DIAGNOSIS — E03.9 HYPOTHYROIDISM, UNSPECIFIED TYPE: ICD-10-CM

## 2024-01-13 DIAGNOSIS — E56.9 VITAMIN DEFICIENCY: ICD-10-CM

## 2024-01-13 PROCEDURE — 99308 SBSQ NF CARE LOW MDM 20: CPT | Performed by: INTERNAL MEDICINE

## 2024-01-13 NOTE — LETTER
Patient: Ama Pierce  : 1930    Encounter Date: 2024    Name: Ama Pierce  : 1930  MRN: [id number]  Visit Date: 2024  Chief Complaint: Monthly visit for management of chronic disease    HPI: 89 y/o female who was admitted to geropsych unit at [facility] for increased delusions, significant weight loss, paranoia disorganization with concern for her safety due to lack of support. She remained on geropsych unit and was closely monitored, meds were adjusted and once she was stable. Psych rec 24 hour supervision, guardianship, supervised medication administration. Therefore, she was transferred to [facility] to reside on secured dementia unit to ensure safety, appropriate medical management.    Subjective: Seen and examined today. Sitting up in chair in the common area. Offers no complaints. Nursing reports no issues. Denies N/V/D/C/CP. No fever or chills. I have reviewed nursing notes since my last visit and document any significant changes Reviewed orders, medications, Labs. Reviewed and updated Interval hx and meds reviewed. Reviewed chart looking at current medications, treatments, labs, x-rays etc.     ROS:  As above in subjective. Otherwise, all other systems have been reviewed and are negative for complaint.    Medications:  Current Outpatient Medications   Medication Instructions   • acetaminophen (TYLENOL) 650 mg, oral, Every 6 hours PRN   • bisacodyl (DULCOLAX) 10 mg, rectal, Once as needed   • cholecalciferol (VITAMIN D-3) 125 mcg, oral, Daily   • cranberry extract 425 mg capsule 1 capsule, oral, Daily   • cycloSPORINE 0.05 % drops 2 drops, ophthalmic (eye), 2 times daily PRN   • latanoprost (Xalatan) 0.005 % ophthalmic solution 1 drop, Right Eye, Nightly   • levothyroxine (LEVOXYL) 88 mcg, oral, Daily before breakfast   • magnesium hydroxide (Milk of Magnesia) 400 mg/5 mL suspension 30 mL, oral, Daily PRN   • melatonin 1 mg tablet,disintegrating 1 tablet, oral, Daily   • nystatin  (Mycostatin) 100,000 unit/gram powder 1 Application, Topical, As needed   • OLANZapine (ZyPREXA) 2.5 mg tablet 1 tablet, oral, Nightly   • potassium chloride CR 10 mEq ER tablet 10 mEq, oral, Daily, Do not crush, chew, or split.   • saccharomyces boulardii (FLORASTOR) 250 mg, oral, Daily        Visit Vitals  /70   Pulse 64   Temp 36.1 °C (97 °F)   Resp 16   Wt 56.7 kg (124 lb 14.4 oz)   SpO2 96%        Physical Exam:  Constitional: Elderly female pt. awake/alert x1-2, no distress. Very pleasant  Eye: EOMI, clear sclera  ENMT: mucous membranes moist, no apparent injury, no lesions seen  Head/Neck: Neck supple, no apparent injury, thyroid without mass or tenderness. No JVD, trachea midline, no bruits  Respiratory/Thorax: Patent airways, CTAB, normal breath sounds with good chest expansion, thorax symmetric  Cardiovascular: Regular rate and rhythm, no murmurs, 2+ equal pulses of the extremites, normal S1 & S2  Gastrointestional: Nondistended, soft, non tender, no rebound tenderness or guarding, no masses palpable, no organomegaly, +BS  Genitourinary: Deferred  Musculoskeletal: ROM intact, no joint swelling  Extremities: GARCIA equally, peripheral pulses intact, no edema.   Neurological: Intact senses, motor, response and reflexes, normal strength  Lymphatic: No significant lymphadenopathy  Psychological: Appropriate mood and behavior   Skin: Warm and dry, no lesions, no rashes.    Results/Data:   Lab Results   Component Value Date    WBC 7.7 01/26/2020    HGB 13.1 01/26/2020    HCT 42.1 01/26/2020     01/26/2020    ALT 21 07/23/2018    AST 53 (H) 07/23/2018     01/26/2020    K 3.4 (L) 01/26/2020     01/26/2020    CREATININE 0.92 01/26/2020    BUN 24 (H) 01/26/2020    CO2 27 01/26/2020    TSH 1.06 07/23/2018    INR 1.0 01/26/2020       Provider Impression:   Paranoid Schizophrenia   - reside in secured dementia unit.  - No new issues or behaviors noted or reported.   - Psych is following, med  adjustments per them (not on anything currently)    Hypothyroidism  - c/w Synthroid.   - Monitor TSH free T4 q6m and PRN    H/o falls  #s/P fall with left radial fracture in January 2020  - Healed    [place]  - hearing aides in place    Vitamin Deficiency  - c/w supplementation    Glaucoma  - c/w medicated eye drops    Fluid/electrolyte/nutrition - Moderate Protein Calorie Malnutrition  - monitor weight  - continue with daily KcL 10meq  - continue monitor PO intake closely.   - dietician following  - labwork q6mths     Code Status  - DNRCCA    Disposition  - Pt resides on a secured memory care unit.  ----------------  Written by Marci Tan RN acting as a scribe for Dr. Santacruz. This note accurately reflects the work and decisions made by Dr. Santacruz.     I, Dr. Santacruz, attest all medical record entries made by the scribe were under my direction and were personally dictated by me. I have reviewed the chart and agree that the record accurately reflects my performance of the history, physical exam, and assessment and plan.     Electronically Signed By: Crista Castanon MD   3/12/24 12:31 PM

## 2024-03-09 ENCOUNTER — NURSING HOME VISIT (OUTPATIENT)
Dept: POST ACUTE CARE | Facility: EXTERNAL LOCATION | Age: 89
End: 2024-03-09
Payer: COMMERCIAL

## 2024-03-09 DIAGNOSIS — E03.9 HYPOTHYROIDISM, UNSPECIFIED TYPE: ICD-10-CM

## 2024-03-09 DIAGNOSIS — E44.0 MODERATE PROTEIN-CALORIE MALNUTRITION (MULTI): ICD-10-CM

## 2024-03-09 DIAGNOSIS — H40.9 GLAUCOMA, UNSPECIFIED GLAUCOMA TYPE, UNSPECIFIED LATERALITY: ICD-10-CM

## 2024-03-09 DIAGNOSIS — F20.0 PARANOID SCHIZOPHRENIA (MULTI): ICD-10-CM

## 2024-03-09 DIAGNOSIS — E56.9 VITAMIN DEFICIENCY: ICD-10-CM

## 2024-03-09 DIAGNOSIS — Z91.81 HISTORY OF FALL: ICD-10-CM

## 2024-03-09 PROCEDURE — 99308 SBSQ NF CARE LOW MDM 20: CPT | Performed by: INTERNAL MEDICINE

## 2024-03-09 NOTE — LETTER
Patient: Ama Pierce  : 1930    Encounter Date: 2024    Name: Ama Pierce  : 1930  MRN: [id number]  Visit Date: 3/9/2024  Chief Complaint: Monthly visit for management of chronic disease    HPI: 89 y/o female who was admitted to geropsych unit at [facility] for increased delusions, significant weight loss, paranoia disorganization with concern for her safety due to lack of support. She remained on geropsych unit and was closely monitored, meds were adjusted and once she was stable. Psych rec 24 hour supervision, guardianship, supervised medication administration. Therefore, she was transferred to [facility] to reside on secured dementia unit to ensure safety, appropriate medical management.    Subjective: Seen and examined today. Sitting up in chair in the common area. Offers no complaints. Nursing reports no issues. Denies N/V/D/C/CP. No fever or chills. I have reviewed nursing notes since my last visit and document any significant changes Reviewed orders, medications, Labs. Reviewed and updated Interval hx and meds reviewed. Reviewed chart looking at current medications, treatments, labs, x-rays etc.     ROS:  As above in subjective. Otherwise, all other systems have been reviewed and are negative for complaint.    Medications:  Current Outpatient Medications   Medication Instructions   • acetaminophen (TYLENOL) 650 mg, oral, Every 6 hours PRN   • bisacodyl (DULCOLAX) 10 mg, rectal, Once as needed   • cholecalciferol (VITAMIN D-3) 125 mcg, oral, Daily   • cranberry extract 425 mg capsule 1 capsule, oral, Daily   • cycloSPORINE 0.05 % drops 2 drops, ophthalmic (eye), 2 times daily PRN   • latanoprost (Xalatan) 0.005 % ophthalmic solution 1 drop, Right Eye, Nightly   • levothyroxine (LEVOXYL) 88 mcg, oral, Daily before breakfast   • magnesium hydroxide (Milk of Magnesia) 400 mg/5 mL suspension 30 mL, oral, Daily PRN   • melatonin 1 mg tablet,disintegrating 1 tablet, oral, Daily   • nystatin  (Mycostatin) 100,000 unit/gram powder 1 Application, Topical, As needed   • OLANZapine (ZyPREXA) 2.5 mg tablet 1 tablet, oral, Nightly   • potassium chloride CR 10 mEq ER tablet 10 mEq, oral, Daily, Do not crush, chew, or split.   • saccharomyces boulardii (FLORASTOR) 250 mg, oral, Daily      Visit Vitals  Smoking Status Never      Physical Exam:  Constitional: Elderly female pt. awake/alert x1-2, no distress. Very pleasant  Eye: EOMI, clear sclera  ENMT: mucous membranes moist, no apparent injury, no lesions seen  Head/Neck: Neck supple, no apparent injury, thyroid without mass or tenderness. No JVD, trachea midline, no bruits  Respiratory/Thorax: Patent airways, CTAB, normal breath sounds with good chest expansion, thorax symmetric  Cardiovascular: Regular rate and rhythm, no murmurs, 2+ equal pulses of the extremites, normal S1 & S2  Gastrointestional: Nondistended, soft, non tender, no rebound tenderness or guarding, no masses palpable, no organomegaly, +BS  Genitourinary: Deferred  Musculoskeletal: ROM intact, no joint swelling  Extremities: GARCIA equally, peripheral pulses intact, no edema.   Neurological: Intact senses, motor, response and reflexes, normal strength  Lymphatic: No significant lymphadenopathy  Psychological: Appropriate mood and behavior   Skin: Warm and dry, no lesions, no rashes.    Results/Data:   Lab Results   Component Value Date    WBC 7.7 01/26/2020    HGB 13.1 01/26/2020    HCT 42.1 01/26/2020     01/26/2020    ALT 21 07/23/2018    AST 53 (H) 07/23/2018     01/26/2020    K 3.4 (L) 01/26/2020     01/26/2020    CREATININE 0.92 01/26/2020    BUN 24 (H) 01/26/2020    CO2 27 01/26/2020    TSH 1.06 07/23/2018    INR 1.0 01/26/2020     Provider Impression:   Paranoid Schizophrenia   - reside in secured dementia unit.  - No new issues or behaviors noted or reported.   - Psych is following, med adjustments per them (not on anything currently)    Hypothyroidism  - c/w Synthroid.    - Monitor TSH free T4 q6m and PRN    H/o falls  #s/P fall with left radial fracture in January 2020  - Healed    [place]  - hearing aides in place    Vitamin Deficiency  - c/w supplementation    Glaucoma  - c/w medicated eye drops    Fluid/electrolyte/nutrition - Moderate Protein Calorie Malnutrition  - monitor weight  - continue with daily KcL 10meq  - continue monitor PO intake closely.   - dietician following  - labwork q6mths     Code Status  - DNRCCA    Disposition  - Pt resides on a secured memory care unit.  ----------------  Written by Marci Tan RN acting as a scribe for Dr. Santacruz. This note accurately reflects the work and decisions made by Dr. Santacruz.     I, Dr. Santacruz, attest all medical record entries made by the scribe were under my direction and were personally dictated by me. I have reviewed the chart and agree that the record accurately reflects my performance of the history, physical exam, and assessment and plan.       Electronically Signed By: Crista Castanon MD   6/11/24  1:38 PM

## 2024-03-11 VITALS
RESPIRATION RATE: 16 BRPM | OXYGEN SATURATION: 96 % | HEART RATE: 64 BPM | SYSTOLIC BLOOD PRESSURE: 118 MMHG | DIASTOLIC BLOOD PRESSURE: 70 MMHG | TEMPERATURE: 97 F | WEIGHT: 124.9 LBS

## 2024-03-11 PROBLEM — F41.9 ANXIETY: Status: ACTIVE | Noted: 2024-03-11

## 2024-03-11 PROBLEM — H47.029 OPTIC NERVE HEMORRHAGE: Status: RESOLVED | Noted: 2024-03-11 | Resolved: 2024-03-11

## 2024-03-11 PROBLEM — K21.9 CHRONIC GERD: Status: ACTIVE | Noted: 2024-03-11

## 2024-03-11 PROBLEM — H52.4 HYPEROPIA OF RIGHT EYE WITH ASTIGMATISM AND PRESBYOPIA: Status: RESOLVED | Noted: 2024-03-11 | Resolved: 2024-03-11

## 2024-03-11 PROBLEM — E87.6 HYPOKALEMIA: Status: RESOLVED | Noted: 2024-03-11 | Resolved: 2024-03-11

## 2024-03-11 PROBLEM — H52.01 HYPEROPIA OF RIGHT EYE WITH ASTIGMATISM AND PRESBYOPIA: Status: RESOLVED | Noted: 2024-03-11 | Resolved: 2024-03-11

## 2024-03-11 PROBLEM — H04.123 DRY EYE SYNDROME OF BOTH EYES: Status: ACTIVE | Noted: 2024-03-11

## 2024-03-11 PROBLEM — G89.29 CHRONIC PAIN: Status: ACTIVE | Noted: 2024-03-11

## 2024-03-11 PROBLEM — H35.3290 EXUDATIVE AGE-RELATED MACULAR DEGENERATION (MULTI): Status: ACTIVE | Noted: 2024-03-11

## 2024-03-11 PROBLEM — Z96.1 PSEUDOPHAKIA, BOTH EYES: Status: RESOLVED | Noted: 2024-03-11 | Resolved: 2024-03-11

## 2024-03-11 PROBLEM — K59.09 CONSTIPATION, CHRONIC: Status: ACTIVE | Noted: 2024-03-11

## 2024-03-11 PROBLEM — H26.493 PCO (POSTERIOR CAPSULAR OPACIFICATION), BILATERAL: Status: RESOLVED | Noted: 2024-03-11 | Resolved: 2024-03-11

## 2024-03-11 PROBLEM — R53.1 WEAKNESS GENERALIZED: Status: RESOLVED | Noted: 2024-03-11 | Resolved: 2024-03-11

## 2024-03-11 PROBLEM — H52.201 HYPEROPIA OF RIGHT EYE WITH ASTIGMATISM AND PRESBYOPIA: Status: RESOLVED | Noted: 2024-03-11 | Resolved: 2024-03-11

## 2024-03-11 PROBLEM — S52.121A RIGHT RADIAL HEAD FRACTURE: Status: RESOLVED | Noted: 2024-03-11 | Resolved: 2024-03-11

## 2024-03-11 PROBLEM — R53.81 PHYSICAL DECONDITIONING: Status: RESOLVED | Noted: 2024-03-11 | Resolved: 2024-03-11

## 2024-03-11 RX ORDER — NYSTATIN 100000 [USP'U]/G
1 POWDER TOPICAL AS NEEDED
COMMUNITY

## 2024-03-11 RX ORDER — LEVOTHYROXINE SODIUM 88 UG/1
88 TABLET ORAL
COMMUNITY

## 2024-03-11 RX ORDER — ADHESIVE BANDAGE
30 BANDAGE TOPICAL DAILY PRN
COMMUNITY

## 2024-03-11 RX ORDER — CHOLECALCIFEROL (VITAMIN D3) 125 MCG
125 CAPSULE ORAL DAILY
COMMUNITY

## 2024-03-11 RX ORDER — POTASSIUM CHLORIDE 750 MG/1
10 TABLET, FILM COATED, EXTENDED RELEASE ORAL DAILY
COMMUNITY

## 2024-03-11 RX ORDER — BIOTIN 5000 MCG
1 TABLET,DISINTEGRATING ORAL DAILY
COMMUNITY

## 2024-03-11 RX ORDER — DIMETHICONE 13 MG/ML
1 LOTION TOPICAL DAILY
COMMUNITY

## 2024-03-11 RX ORDER — BISACODYL 10 MG/1
10 SUPPOSITORY RECTAL ONCE AS NEEDED
COMMUNITY

## 2024-03-11 RX ORDER — OLANZAPINE 2.5 MG/1
1 TABLET ORAL NIGHTLY
COMMUNITY

## 2024-03-11 RX ORDER — ACETAMINOPHEN 325 MG/1
650 TABLET ORAL EVERY 6 HOURS PRN
COMMUNITY

## 2024-03-11 RX ORDER — LATANOPROST 50 UG/ML
1 SOLUTION/ DROPS OPHTHALMIC NIGHTLY
COMMUNITY
Start: 2017-09-28

## 2024-03-11 RX ORDER — BUTYROSPERMUM PARKII(SHEA BUTTER), SIMMONDSIA CHINENSIS (JOJOBA) SEED OIL, ALOE BARBADENSIS LEAF EXTRACT .01; 1; 3.5 G/100G; G/100G; G/100G
250 LIQUID TOPICAL DAILY
COMMUNITY

## 2024-03-11 NOTE — PROGRESS NOTES
Name: Ama Pierce  : 1930  MRN: [id number]  Visit Date: 2024  Chief Complaint: Monthly visit for management of chronic disease    HPI: 89 y/o female who was admitted to geropsych unit at [facility] for increased delusions, significant weight loss, paranoia disorganization with concern for her safety due to lack of support. She remained on geropsych unit and was closely monitored, meds were adjusted and once she was stable. Psych rec 24 hour supervision, guardianship, supervised medication administration. Therefore, she was transferred to [facility] to reside on secured dementia unit to ensure safety, appropriate medical management.    Subjective: Seen and examined today. Sitting up in chair in the common area. Offers no complaints. Nursing reports no issues. Denies N/V/D/C/CP. No fever or chills. I have reviewed nursing notes since my last visit and document any significant changes Reviewed orders, medications, Labs. Reviewed and updated Interval hx and meds reviewed. Reviewed chart looking at current medications, treatments, labs, x-rays etc.     ROS:  As above in subjective. Otherwise, all other systems have been reviewed and are negative for complaint.    Medications:  Current Outpatient Medications   Medication Instructions    acetaminophen (TYLENOL) 650 mg, oral, Every 6 hours PRN    bisacodyl (DULCOLAX) 10 mg, rectal, Once as needed    cholecalciferol (VITAMIN D-3) 125 mcg, oral, Daily    cranberry extract 425 mg capsule 1 capsule, oral, Daily    cycloSPORINE 0.05 % drops 2 drops, ophthalmic (eye), 2 times daily PRN    latanoprost (Xalatan) 0.005 % ophthalmic solution 1 drop, Right Eye, Nightly    levothyroxine (LEVOXYL) 88 mcg, oral, Daily before breakfast    magnesium hydroxide (Milk of Magnesia) 400 mg/5 mL suspension 30 mL, oral, Daily PRN    melatonin 1 mg tablet,disintegrating 1 tablet, oral, Daily    nystatin (Mycostatin) 100,000 unit/gram powder 1 Application, Topical, As needed     OLANZapine (ZyPREXA) 2.5 mg tablet 1 tablet, oral, Nightly    potassium chloride CR 10 mEq ER tablet 10 mEq, oral, Daily, Do not crush, chew, or split.    saccharomyces boulardii (FLORASTOR) 250 mg, oral, Daily        Visit Vitals  /70   Pulse 64   Temp 36.1 °C (97 °F)   Resp 16   Wt 56.7 kg (124 lb 14.4 oz)   SpO2 96%        Physical Exam:  Constitional: Elderly female pt. awake/alert x1-2, no distress. Very pleasant  Eye: EOMI, clear sclera  ENMT: mucous membranes moist, no apparent injury, no lesions seen  Head/Neck: Neck supple, no apparent injury, thyroid without mass or tenderness. No JVD, trachea midline, no bruits  Respiratory/Thorax: Patent airways, CTAB, normal breath sounds with good chest expansion, thorax symmetric  Cardiovascular: Regular rate and rhythm, no murmurs, 2+ equal pulses of the extremites, normal S1 & S2  Gastrointestional: Nondistended, soft, non tender, no rebound tenderness or guarding, no masses palpable, no organomegaly, +BS  Genitourinary: Deferred  Musculoskeletal: ROM intact, no joint swelling  Extremities: GARCIA equally, peripheral pulses intact, no edema.   Neurological: Intact senses, motor, response and reflexes, normal strength  Lymphatic: No significant lymphadenopathy  Psychological: Appropriate mood and behavior   Skin: Warm and dry, no lesions, no rashes.    Results/Data:   Lab Results   Component Value Date    WBC 7.7 01/26/2020    HGB 13.1 01/26/2020    HCT 42.1 01/26/2020     01/26/2020    ALT 21 07/23/2018    AST 53 (H) 07/23/2018     01/26/2020    K 3.4 (L) 01/26/2020     01/26/2020    CREATININE 0.92 01/26/2020    BUN 24 (H) 01/26/2020    CO2 27 01/26/2020    TSH 1.06 07/23/2018    INR 1.0 01/26/2020       Provider Impression:   Paranoid Schizophrenia   - reside in secured dementia unit.  - No new issues or behaviors noted or reported.   - Psych is following, med adjustments per them (not on anything currently)    Hypothyroidism  - c/w  Synthroid.   - Monitor TSH free T4 q6m and PRN    H/o falls  #s/P fall with left radial fracture in January 2020  - Healed    [place]  - hearing aides in place    Vitamin Deficiency  - c/w supplementation    Glaucoma  - c/w medicated eye drops    Fluid/electrolyte/nutrition - Moderate Protein Calorie Malnutrition  - monitor weight  - continue with daily KcL 10meq  - continue monitor PO intake closely.   - dietician following  - labwork q6mths     Code Status  - DNRCCA    Disposition  - Pt resides on a secured memory care unit.  ----------------  Written by Marci Tan RN acting as a scribe for Dr. Santacruz. This note accurately reflects the work and decisions made by Dr. Santacruz.     I, Dr. Santacruz, attest all medical record entries made by the scribe were under my direction and were personally dictated by me. I have reviewed the chart and agree that the record accurately reflects my performance of the history, physical exam, and assessment and plan.

## 2024-05-02 ENCOUNTER — NURSING HOME VISIT (OUTPATIENT)
Dept: POST ACUTE CARE | Facility: EXTERNAL LOCATION | Age: 89
End: 2024-05-02
Payer: COMMERCIAL

## 2024-05-02 DIAGNOSIS — E03.9 HYPOTHYROIDISM, UNSPECIFIED TYPE: ICD-10-CM

## 2024-05-02 DIAGNOSIS — F20.0 PARANOID SCHIZOPHRENIA (MULTI): ICD-10-CM

## 2024-05-02 DIAGNOSIS — E44.0 MODERATE PROTEIN-CALORIE MALNUTRITION (MULTI): ICD-10-CM

## 2024-05-02 DIAGNOSIS — Z91.81 HISTORY OF FALL: ICD-10-CM

## 2024-05-02 DIAGNOSIS — H40.9 GLAUCOMA, UNSPECIFIED GLAUCOMA TYPE, UNSPECIFIED LATERALITY: ICD-10-CM

## 2024-05-02 DIAGNOSIS — E56.9 VITAMIN DEFICIENCY: ICD-10-CM

## 2024-05-02 PROCEDURE — 99308 SBSQ NF CARE LOW MDM 20: CPT | Performed by: INTERNAL MEDICINE

## 2024-05-02 NOTE — LETTER
Patient: Ama Pierce  : 1930    Encounter Date: 2024    Name: Ama Pierce  : 1930  MRN: [id number]  Visit Date: 2024  Chief Complaint: Monthly visit for management of chronic disease    HPI: 89 y/o female who was admitted to geropsych unit at [facility] for increased delusions, significant weight loss, paranoia disorganization with concern for her safety due to lack of support. She remained on geropsych unit and was closely monitored, meds were adjusted and once she was stable. Psych rec 24 hour supervision, guardianship, supervised medication administration. Therefore, she was transferred to [facility] to reside on secured dementia unit to ensure safety, appropriate medical management.    Subjective: Seen and examined today. Sitting up in chair in the common area. Offers no complaints. Nursing reports no issues. Denies N/V/D/C/CP. No fever or chills. I have reviewed nursing notes since my last visit and document any significant changes Reviewed orders, medications, Labs. Reviewed and updated Interval hx and meds reviewed. Reviewed chart looking at current medications, treatments, labs, x-rays etc.     ROS:  As above in subjective. Otherwise, all other systems have been reviewed and are negative for complaint.    Medications:  Current Outpatient Medications   Medication Instructions   • acetaminophen (TYLENOL) 650 mg, oral, Every 6 hours PRN   • bisacodyl (DULCOLAX) 10 mg, rectal, Once as needed   • cholecalciferol (VITAMIN D-3) 125 mcg, oral, Daily   • cranberry extract 425 mg capsule 1 capsule, oral, Daily   • cycloSPORINE 0.05 % drops 2 drops, ophthalmic (eye), 2 times daily PRN   • latanoprost (Xalatan) 0.005 % ophthalmic solution 1 drop, Right Eye, Nightly   • levothyroxine (LEVOXYL) 88 mcg, oral, Daily before breakfast   • magnesium hydroxide (Milk of Magnesia) 400 mg/5 mL suspension 30 mL, oral, Daily PRN   • melatonin 1 mg tablet,disintegrating 1 tablet, oral, Daily   • nystatin  (Mycostatin) 100,000 unit/gram powder 1 Application, Topical, As needed   • OLANZapine (ZyPREXA) 2.5 mg tablet 1 tablet, oral, Nightly   • potassium chloride CR 10 mEq ER tablet 10 mEq, oral, Daily, Do not crush, chew, or split.   • saccharomyces boulardii (FLORASTOR) 250 mg, oral, Daily      Visit Vitals  Smoking Status Never      Physical Exam:  Constitional: Elderly female pt. awake/alert x1-2, no distress. Very pleasant  Eye: EOMI, clear sclera  ENMT: mucous membranes moist, no apparent injury, no lesions seen  Head/Neck: Neck supple, no apparent injury, thyroid without mass or tenderness. No JVD, trachea midline, no bruits  Respiratory/Thorax: Patent airways, CTAB, normal breath sounds with good chest expansion, thorax symmetric  Cardiovascular: Regular rate and rhythm, no murmurs, 2+ equal pulses of the extremites, normal S1 & S2  Gastrointestional: Nondistended, soft, non tender, no rebound tenderness or guarding, no masses palpable, no organomegaly, +BS  Genitourinary: Deferred  Musculoskeletal: ROM intact, no joint swelling  Extremities: GARCIA equally, peripheral pulses intact, no edema.   Neurological: Intact senses, motor, response and reflexes, normal strength  Lymphatic: No significant lymphadenopathy  Psychological: Appropriate mood and behavior   Skin: Warm and dry, no lesions, no rashes.    Results/Data:   Lab Results   Component Value Date    WBC 7.7 01/26/2020    HGB 13.1 01/26/2020    HCT 42.1 01/26/2020     01/26/2020    ALT 21 07/23/2018    AST 53 (H) 07/23/2018     01/26/2020    K 3.4 (L) 01/26/2020     01/26/2020    CREATININE 0.92 01/26/2020    BUN 24 (H) 01/26/2020    CO2 27 01/26/2020    TSH 1.06 07/23/2018    INR 1.0 01/26/2020     Provider Impression:   Paranoid Schizophrenia   - reside in secured dementia unit.  - No new issues or behaviors noted or reported.   - Psych is following, med adjustments per them (not on anything currently)    Hypothyroidism  - c/w Synthroid.    - Monitor TSH free T4 q6m and PRN    H/o falls  #s/P fall with left radial fracture in January 2020  - Healed    [place]  - hearing aides in place    Vitamin Deficiency  - c/w supplementation    Glaucoma  - c/w medicated eye drops    Fluid/electrolyte/nutrition - Moderate Protein Calorie Malnutrition  - monitor weight  - continue with daily KcL 10meq  - continue monitor PO intake closely.   - dietician following  - labwork q6mths     Code Status  - DNRCCA    Disposition  - Pt resides on a secured memory care unit.  ----------------  Written by Marci Tan RN acting as a scribe for Dr. Santacruz. This note accurately reflects the work and decisions made by Dr. Santacruz.     I, Dr. Santacruz, attest all medical record entries made by the scribe were under my direction and were personally dictated by me. I have reviewed the chart and agree that the record accurately reflects my performance of the history, physical exam, and assessment and plan.     Electronically Signed By: Crista Castanon MD   6/11/24  1:39 PM

## 2024-06-10 NOTE — PROGRESS NOTES
Name: Ama Pierce  : 1930  MRN: [id number]  Visit Date: 2024  Chief Complaint: Monthly visit for management of chronic disease    HPI: 89 y/o female who was admitted to geropsych unit at [facility] for increased delusions, significant weight loss, paranoia disorganization with concern for her safety due to lack of support. She remained on geropsych unit and was closely monitored, meds were adjusted and once she was stable. Psych rec 24 hour supervision, guardianship, supervised medication administration. Therefore, she was transferred to [facility] to reside on secured dementia unit to ensure safety, appropriate medical management.    Subjective: Seen and examined today. Sitting up in chair in the common area. Offers no complaints. Nursing reports no issues. Denies N/V/D/C/CP. No fever or chills. I have reviewed nursing notes since my last visit and document any significant changes Reviewed orders, medications, Labs. Reviewed and updated Interval hx and meds reviewed. Reviewed chart looking at current medications, treatments, labs, x-rays etc.     ROS:  As above in subjective. Otherwise, all other systems have been reviewed and are negative for complaint.    Medications:  Current Outpatient Medications   Medication Instructions    acetaminophen (TYLENOL) 650 mg, oral, Every 6 hours PRN    bisacodyl (DULCOLAX) 10 mg, rectal, Once as needed    cholecalciferol (VITAMIN D-3) 125 mcg, oral, Daily    cranberry extract 425 mg capsule 1 capsule, oral, Daily    cycloSPORINE 0.05 % drops 2 drops, ophthalmic (eye), 2 times daily PRN    latanoprost (Xalatan) 0.005 % ophthalmic solution 1 drop, Right Eye, Nightly    levothyroxine (LEVOXYL) 88 mcg, oral, Daily before breakfast    magnesium hydroxide (Milk of Magnesia) 400 mg/5 mL suspension 30 mL, oral, Daily PRN    melatonin 1 mg tablet,disintegrating 1 tablet, oral, Daily    nystatin (Mycostatin) 100,000 unit/gram powder 1 Application, Topical, As needed     OLANZapine (ZyPREXA) 2.5 mg tablet 1 tablet, oral, Nightly    potassium chloride CR 10 mEq ER tablet 10 mEq, oral, Daily, Do not crush, chew, or split.    saccharomyces boulardii (FLORASTOR) 250 mg, oral, Daily      Visit Vitals  Smoking Status Never      Physical Exam:  Constitional: Elderly female pt. awake/alert x1-2, no distress. Very pleasant  Eye: EOMI, clear sclera  ENMT: mucous membranes moist, no apparent injury, no lesions seen  Head/Neck: Neck supple, no apparent injury, thyroid without mass or tenderness. No JVD, trachea midline, no bruits  Respiratory/Thorax: Patent airways, CTAB, normal breath sounds with good chest expansion, thorax symmetric  Cardiovascular: Regular rate and rhythm, no murmurs, 2+ equal pulses of the extremites, normal S1 & S2  Gastrointestional: Nondistended, soft, non tender, no rebound tenderness or guarding, no masses palpable, no organomegaly, +BS  Genitourinary: Deferred  Musculoskeletal: ROM intact, no joint swelling  Extremities: GARCIA equally, peripheral pulses intact, no edema.   Neurological: Intact senses, motor, response and reflexes, normal strength  Lymphatic: No significant lymphadenopathy  Psychological: Appropriate mood and behavior   Skin: Warm and dry, no lesions, no rashes.    Results/Data:   Lab Results   Component Value Date    WBC 7.7 01/26/2020    HGB 13.1 01/26/2020    HCT 42.1 01/26/2020     01/26/2020    ALT 21 07/23/2018    AST 53 (H) 07/23/2018     01/26/2020    K 3.4 (L) 01/26/2020     01/26/2020    CREATININE 0.92 01/26/2020    BUN 24 (H) 01/26/2020    CO2 27 01/26/2020    TSH 1.06 07/23/2018    INR 1.0 01/26/2020     Provider Impression:   Paranoid Schizophrenia   - reside in secured dementia unit.  - No new issues or behaviors noted or reported.   - Psych is following, med adjustments per them (not on anything currently)    Hypothyroidism  - c/w Synthroid.   - Monitor TSH free T4 q6m and PRN    H/o falls  #s/P fall with left radial  fracture in January 2020  - Healed    [place]  - hearing aides in place    Vitamin Deficiency  - c/w supplementation    Glaucoma  - c/w medicated eye drops    Fluid/electrolyte/nutrition - Moderate Protein Calorie Malnutrition  - monitor weight  - continue with daily KcL 10meq  - continue monitor PO intake closely.   - dietician following  - labwork q6[facility]     Code Status  - DNRCCA    Disposition  - Pt resides on a secured memory care unit.  ----------------  Written by Marci Tan RN acting as a scribe for Dr. Santacruz. This note accurately reflects the work and decisions made by Dr. Santacruz.     I, Dr. Santacruz, attest all medical record entries made by the scribe were under my direction and were personally dictated by me. I have reviewed the chart and agree that the record accurately reflects my performance of the history, physical exam, and assessment and plan.

## 2024-06-10 NOTE — PROGRESS NOTES
Name: Ama Pierce  : 1930  MRN: [id number]  Visit Date: 3/9/2024  Chief Complaint: Monthly visit for management of chronic disease    HPI: 89 y/o female who was admitted to geropsych unit at [facility] for increased delusions, significant weight loss, paranoia disorganization with concern for her safety due to lack of support. She remained on geropsych unit and was closely monitored, meds were adjusted and once she was stable. Psych rec 24 hour supervision, guardianship, supervised medication administration. Therefore, she was transferred to [facility] to reside on secured dementia unit to ensure safety, appropriate medical management.    Subjective: Seen and examined today. Sitting up in chair in the common area. Offers no complaints. Nursing reports no issues. Denies N/V/D/C/CP. No fever or chills. I have reviewed nursing notes since my last visit and document any significant changes Reviewed orders, medications, Labs. Reviewed and updated Interval hx and meds reviewed. Reviewed chart looking at current medications, treatments, labs, x-rays etc.     ROS:  As above in subjective. Otherwise, all other systems have been reviewed and are negative for complaint.    Medications:  Current Outpatient Medications   Medication Instructions    acetaminophen (TYLENOL) 650 mg, oral, Every 6 hours PRN    bisacodyl (DULCOLAX) 10 mg, rectal, Once as needed    cholecalciferol (VITAMIN D-3) 125 mcg, oral, Daily    cranberry extract 425 mg capsule 1 capsule, oral, Daily    cycloSPORINE 0.05 % drops 2 drops, ophthalmic (eye), 2 times daily PRN    latanoprost (Xalatan) 0.005 % ophthalmic solution 1 drop, Right Eye, Nightly    levothyroxine (LEVOXYL) 88 mcg, oral, Daily before breakfast    magnesium hydroxide (Milk of Magnesia) 400 mg/5 mL suspension 30 mL, oral, Daily PRN    melatonin 1 mg tablet,disintegrating 1 tablet, oral, Daily    nystatin (Mycostatin) 100,000 unit/gram powder 1 Application, Topical, As needed     OLANZapine (ZyPREXA) 2.5 mg tablet 1 tablet, oral, Nightly    potassium chloride CR 10 mEq ER tablet 10 mEq, oral, Daily, Do not crush, chew, or split.    saccharomyces boulardii (FLORASTOR) 250 mg, oral, Daily      Visit Vitals  Smoking Status Never      Physical Exam:  Constitional: Elderly female pt. awake/alert x1-2, no distress. Very pleasant  Eye: EOMI, clear sclera  ENMT: mucous membranes moist, no apparent injury, no lesions seen  Head/Neck: Neck supple, no apparent injury, thyroid without mass or tenderness. No JVD, trachea midline, no bruits  Respiratory/Thorax: Patent airways, CTAB, normal breath sounds with good chest expansion, thorax symmetric  Cardiovascular: Regular rate and rhythm, no murmurs, 2+ equal pulses of the extremites, normal S1 & S2  Gastrointestional: Nondistended, soft, non tender, no rebound tenderness or guarding, no masses palpable, no organomegaly, +BS  Genitourinary: Deferred  Musculoskeletal: ROM intact, no joint swelling  Extremities: GARCIA equally, peripheral pulses intact, no edema.   Neurological: Intact senses, motor, response and reflexes, normal strength  Lymphatic: No significant lymphadenopathy  Psychological: Appropriate mood and behavior   Skin: Warm and dry, no lesions, no rashes.    Results/Data:   Lab Results   Component Value Date    WBC 7.7 01/26/2020    HGB 13.1 01/26/2020    HCT 42.1 01/26/2020     01/26/2020    ALT 21 07/23/2018    AST 53 (H) 07/23/2018     01/26/2020    K 3.4 (L) 01/26/2020     01/26/2020    CREATININE 0.92 01/26/2020    BUN 24 (H) 01/26/2020    CO2 27 01/26/2020    TSH 1.06 07/23/2018    INR 1.0 01/26/2020     Provider Impression:   Paranoid Schizophrenia   - reside in secured dementia unit.  - No new issues or behaviors noted or reported.   - Psych is following, med adjustments per them (not on anything currently)    Hypothyroidism  - c/w Synthroid.   - Monitor TSH free T4 q6m and PRN    H/o falls  #s/P fall with left radial  fracture in January 2020  - Healed    [place] [facility] [place]  - hearing aides in place    Vitamin Deficiency  - c/w supplementation    Glaucoma  - c/w medicated eye drops    Fluid/electrolyte/nutrition - Moderate Protein Calorie Malnutrition  - monitor weight  - continue with daily KcL 10meq  - continue monitor PO intake closely.   - dietician following  - labwork q6[facility]     Code Status  - DNRCCA    Disposition  - Pt resides on a secured memory care unit.  ----------------  Written by Marci Tan RN acting as a scribe for Dr. Santacruz. This note accurately reflects the work and decisions made by Dr. Santacruz.     I, Dr. Santacruz, attest all medical record entries made by the scribe were under my direction and were personally dictated by me. I have reviewed the chart and agree that the record accurately reflects my performance of the history, physical exam, and assessment and plan.

## 2024-07-05 ENCOUNTER — NURSING HOME VISIT (OUTPATIENT)
Dept: POST ACUTE CARE | Facility: EXTERNAL LOCATION | Age: 89
End: 2024-07-05
Payer: COMMERCIAL

## 2024-07-05 DIAGNOSIS — E44.0 MODERATE PROTEIN-CALORIE MALNUTRITION (MULTI): ICD-10-CM

## 2024-07-05 DIAGNOSIS — E56.9 VITAMIN DEFICIENCY: ICD-10-CM

## 2024-07-05 DIAGNOSIS — Z91.81 HISTORY OF FALL: ICD-10-CM

## 2024-07-05 DIAGNOSIS — Z00.00 ENCOUNTER FOR ANNUAL WELLNESS VISIT (AWV) IN MEDICARE PATIENT: ICD-10-CM

## 2024-07-05 DIAGNOSIS — E03.9 HYPOTHYROIDISM, UNSPECIFIED TYPE: ICD-10-CM

## 2024-07-05 DIAGNOSIS — F20.0 PARANOID SCHIZOPHRENIA (MULTI): ICD-10-CM

## 2024-07-05 NOTE — LETTER
Patient: Ama Pierce  : 1930    Encounter Date: 2024    Name: Ama Pierce  : 1930  MRN: [id number]  Visit Date: 2024  Chief Complaint: Monthly visit for management of chronic disease. Annual History and Physical. Annual Medicare Wellness Visit.     HPI: 89 y/o female who was admitted to geropsych unit at [facility] for increased delusions, significant weight loss, paranoia disorganization with concern for her safety due to lack of support. She remained on geropsych unit and was closely monitored, meds were adjusted and once she was stable. Psych rec 24 hour supervision, guardianship, supervised medication administration. Therefore, she was transferred to [facility] to reside on secured dementia unit to ensure safety, appropriate medical management.    Subjective: Seen and examined today. Sitting up in chair in the common area. Offers no complaints. Nursing reports no issues. Denies N/V/D/C/CP. No fever or chills.     I have reviewed nursing notes since my last visit and document any significant changes Reviewed orders, medications, Labs. Reviewed and updated Interval hx and meds reviewed. Reviewed chart looking at current medications, treatments, labs, x-rays etc.     ROS:  As above in subjective. Otherwise, all other systems have been reviewed and are negative for complaint.    Medications:  Current Outpatient Medications   Medication Instructions   • acetaminophen (TYLENOL) 650 mg, oral, Every 6 hours PRN   • bisacodyl (DULCOLAX) 10 mg, rectal, Once as needed   • cholecalciferol (VITAMIN D-3) 125 mcg, oral, Daily   • cranberry extract 425 mg capsule 1 capsule, oral, Daily   • cycloSPORINE 0.05 % drops 2 drops, ophthalmic (eye), 2 times daily PRN   • latanoprost (Xalatan) 0.005 % ophthalmic solution 1 drop, Right Eye, Nightly   • levothyroxine (LEVOXYL) 88 mcg, oral, Daily before breakfast   • magnesium hydroxide (Milk of Magnesia) 400 mg/5 mL suspension 30 mL, oral, Daily PRN   •  melatonin 1 mg tablet,disintegrating 1 tablet, oral, Daily   • nystatin (Mycostatin) 100,000 unit/gram powder 1 Application, Topical, As needed   • OLANZapine (ZyPREXA) 2.5 mg tablet 1 tablet, oral, Nightly   • potassium chloride CR 10 mEq ER tablet 10 mEq, oral, Daily, Do not crush, chew, or split.   • saccharomyces boulardii (FLORASTOR) 250 mg, oral, Daily      Visit Vitals  /74   Pulse 74   Temp 36.8 °C (98.2 °F)   Resp 18   Wt 53.8 kg (118 lb 9.6 oz)   SpO2 95%   Smoking Status Never      Past Medical History:   Diagnosis Date   • Benign neoplasm of thyroid gland     Thyroid adenoma   • Disease of thyroid gland    • Exudative age-related macular degeneration, bilateral, stage unspecified (Multi) 01/04/2016    Age-related macular degeneration, wet, both eyes   • Exudative age-related macular degeneration, left eye, stage unspecified (Multi) 10/26/2016    Age-related macular degeneration, wet, left eye   • Exudative age-related macular degeneration, right eye, stage unspecified (Multi) 10/26/2016    Age-related macular degeneration, wet, right eye   • Hyperopia of right eye with astigmatism and presbyopia 03/11/2024   • Hypokalemia 03/11/2024   • Non-Hodgkin lymphoma, unspecified, unspecified site (Multi)     Non-Hodgkin's lymphoma   • 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   • Other conditions influencing health status     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   • Other malaise 02/12/2021    Physical deconditioning   • Other specified personal risk factors, not elsewhere classified 02/12/2021    At increased risk of exposure to COVID-19 virus   • Personal history of other endocrine, nutritional and metabolic disease 06/15/2015    History of hypothyroidism   • Personal history of other endocrine, nutritional and metabolic disease     History of thyroid disease   • Personal history of other endocrine, nutritional and metabolic disease     History of hyperlipidemia   • Personal  history of other endocrine, nutritional and metabolic disease     History of hypothyroidism   • Personal history of other mental and behavioral disorders 06/08/2021    History of schizophrenia   • Personal history of other mental and behavioral disorders 01/17/2020    History of anxiety   • Personal history of other specified conditions 06/21/2020    History of chronic pain   • Personal history of other specified conditions 01/17/2020    History of weakness   • Personal history of other specified conditions     History of lymphadenopathy   • Physical deconditioning 03/11/2024   • Pseudophakia, both eyes 03/11/2024   • Retinal neovascularization, unspecified, unspecified eye     Choroid neovascularization   • Right radial head fracture 03/11/2024   • Unspecified macular degeneration     Age-related macular degeneration      Past Surgical History:   Procedure Laterality Date   • COLONOSCOPY  08/07/2013    Complete Colonoscopy   • MOUTH SURGERY  05/12/2014    Oral Surgery Tooth Extraction   • OTHER SURGICAL HISTORY  08/07/2013    Breast Surgery Puncture Aspiration Of Cyst   • OTHER SURGICAL HISTORY  08/07/2013    Endotracheal Tube Insertion      Social History     Tobacco Use   • Smoking status: Never   • Smokeless tobacco: Never   Substance Use Topics   • Alcohol use: Not Currently      Family History   Problem Relation Name Age of Onset   • Hypertension Other        Allergies   Allergen Reactions   • Atorvastatin Unknown   • Ephedrine Unknown      Immunization History   Administered Date(s) Administered   • Flu vaccine, trivalent, preservative free, HIGH-DOSE, age 65y+ (Fluzone) 10/19/2017   • Influenza, Unspecified 10/15/2003, 11/16/2004, 10/19/2007, 11/03/2010, 09/17/2014   • Influenza, seasonal, injectable 10/01/2012      Physical Exam:  Constitional: Elderly female pt. awake/alert x1-2, no distress. Very pleasant  Eye: EOMI, clear sclera  ENMT: mucous membranes moist, no apparent injury, no lesions  seen  Head/Neck: Neck supple, no apparent injury, thyroid without mass or tenderness. No JVD, trachea midline, no bruits  Respiratory/Thorax: Patent airways, CTAB, normal breath sounds with good chest expansion, thorax symmetric  Cardiovascular: Regular rate and rhythm, no murmurs, 2+ equal pulses of the extremites, normal S1 & S2  Gastrointestional: Nondistended, soft, non tender, no rebound tenderness or guarding, no masses palpable, no organomegaly, +BS  Genitourinary: Deferred  Musculoskeletal: ROM intact, no joint swelling  Extremities: GARCIA equally, peripheral pulses intact, no edema.   Neurological: Intact senses, motor, response and reflexes, normal strength  Lymphatic: No significant lymphadenopathy  Psychological: Appropriate mood and behavior   Skin: Warm and dry, no lesions, no rashes.    Results/Data:   Lab Results   Component Value Date    WBC 7.7 01/26/2020    HGB 13.1 01/26/2020    HCT 42.1 01/26/2020     01/26/2020    ALT 21 07/23/2018    AST 53 (H) 07/23/2018     01/26/2020    K 3.4 (L) 01/26/2020     01/26/2020    CREATININE 0.92 01/26/2020    BUN 24 (H) 01/26/2020    CO2 27 01/26/2020    TSH 1.06 07/23/2018    INR 1.0 01/26/2020     Provider Impression:   Paranoid Schizophrenia   - reside in secured dementia unit.  - No new issues or behaviors noted or reported.   - Psych is following, med adjustments per them (not on anything currently)    Hypothyroidism  - c/w Synthroid.   - Monitor TSH free T4 q6m and PRN    H/o falls  #s/P fall with left radial fracture in January 2020  - Healed    [place]  - hearing aides in place    Vitamin Deficiency  - c/w supplementation    Glaucoma  - c/w medicated eye drops    Fluid/electrolyte/nutrition - Moderate Protein Calorie Malnutrition  - monitor weight  - continue with daily KcL 10meq  - continue monitor PO intake closely.   - dietician following  - labwork q6mths     Code Status  - DNRCCA    Disposition  - Pt resides on a secured memory care  unit.  ----------------  Written by Marci Tan RN acting as a scribe for Dr. Santacruz. This note accurately reflects the work and decisions made by Dr. Santacruz.     I, Dr. Santacruz, attest all medical record entries made by the scribe were under my direction and were personally dictated by me. I have reviewed the chart and agree that the record accurately reflects my performance of the history, physical exam, and assessment and plan.     Electronically Signed By: Crista Castanon MD   9/27/24 12:45 PM

## 2024-09-08 ENCOUNTER — NURSING HOME VISIT (OUTPATIENT)
Dept: POST ACUTE CARE | Facility: EXTERNAL LOCATION | Age: 89
End: 2024-09-08
Payer: MEDICARE

## 2024-09-08 DIAGNOSIS — E44.0 MODERATE PROTEIN-CALORIE MALNUTRITION (MULTI): ICD-10-CM

## 2024-09-08 DIAGNOSIS — Z91.81 HISTORY OF FALL: ICD-10-CM

## 2024-09-08 DIAGNOSIS — F20.0 PARANOID SCHIZOPHRENIA (MULTI): ICD-10-CM

## 2024-09-08 DIAGNOSIS — E03.9 HYPOTHYROIDISM, UNSPECIFIED TYPE: ICD-10-CM

## 2024-09-08 DIAGNOSIS — Z78.9 NURSING HOME RESIDENT: ICD-10-CM

## 2024-09-08 DIAGNOSIS — Z00.00 ROUTINE GENERAL MEDICAL EXAMINATION AT A HEALTH CARE FACILITY: ICD-10-CM

## 2024-09-08 DIAGNOSIS — E56.9 VITAMIN DEFICIENCY: ICD-10-CM

## 2024-09-08 NOTE — LETTER
Patient: Ama Pierce  : 1930    Encounter Date: 2024    Name: Ama Pierce  : 1930  MRN: [id number]  Visit Date: 2024  Chief Complaint: Monthly visit for management of chronic disease.     HPI: 89 y/o female who was admitted to geropsych unit at [facility] for increased delusions, significant weight loss, paranoia disorganization with concern for her safety due to lack of support. She remained on geropsych unit and was closely monitored, meds were adjusted and once she was stable. Psych rec 24 hour supervision, guardianship, supervised medication administration. Therefore, she was transferred to [facility] to reside on secured dementia unit to ensure safety, appropriate medical management.    Subjective: Seen and examined today. Sitting up in chair in the common area. Offers no complaints. Nursing reports no issues. Denies N/V/D/C/CP. No fever or chills.     I have reviewed nursing notes since my last visit and document any significant changes Reviewed orders, medications, Labs. Reviewed and updated Interval hx and meds reviewed. Reviewed chart looking at current medications, treatments, labs, x-rays etc.     ROS:  As above in subjective. Otherwise, all other systems have been reviewed and are negative for complaint.    Medications:  Current Outpatient Medications   Medication Instructions   • acetaminophen (TYLENOL) 650 mg, oral, Every 6 hours PRN   • bisacodyl (DULCOLAX) 10 mg, rectal, Once as needed   • cholecalciferol (VITAMIN D-3) 125 mcg, oral, Daily   • cranberry extract 425 mg capsule 1 capsule, oral, Daily   • cycloSPORINE 0.05 % drops 2 drops, ophthalmic (eye), 2 times daily PRN   • latanoprost (Xalatan) 0.005 % ophthalmic solution 1 drop, Right Eye, Nightly   • levothyroxine (LEVOXYL) 88 mcg, oral, Daily before breakfast   • magnesium hydroxide (Milk of Magnesia) 400 mg/5 mL suspension 30 mL, oral, Daily PRN   • melatonin 1 mg tablet,disintegrating 1 tablet, oral, Daily   •  nystatin (Mycostatin) 100,000 unit/gram powder 1 Application, Topical, As needed   • OLANZapine (ZyPREXA) 2.5 mg tablet 1 tablet, oral, Nightly   • potassium chloride CR 10 mEq ER tablet 10 mEq, oral, Daily, Do not crush, chew, or split.   • saccharomyces boulardii (FLORASTOR) 250 mg, oral, Daily      Physical Exam:  Constitional: Elderly female pt. awake/alert x1-2, no distress. Very pleasant  Eye: EOMI, clear sclera  ENMT: mucous membranes moist, no apparent injury, no lesions seen  Head/Neck: Neck supple, no apparent injury, thyroid without mass or tenderness. No JVD, trachea midline, no bruits  Respiratory/Thorax: Patent airways, CTAB, normal breath sounds with good chest expansion, thorax symmetric  Cardiovascular: Regular rate and rhythm, no murmurs, 2+ equal pulses of the extremites, normal S1 & S2  Gastrointestional: Nondistended, soft, non tender, no rebound tenderness or guarding, no masses palpable, no organomegaly, +BS  Genitourinary: Deferred  Musculoskeletal: ROM intact, no joint swelling  Extremities: GARCIA equally, peripheral pulses intact, no edema.   Neurological: Intact senses, motor, response and reflexes, normal strength  Lymphatic: No significant lymphadenopathy  Psychological: Appropriate mood and behavior   Skin: Warm and dry, no lesions, no rashes.    Results/Data:   Lab Results   Component Value Date    WBC 7.7 01/26/2020    HGB 13.1 01/26/2020    HCT 42.1 01/26/2020     01/26/2020    ALT 21 07/23/2018    AST 53 (H) 07/23/2018     01/26/2020    K 3.4 (L) 01/26/2020     01/26/2020    CREATININE 0.92 01/26/2020    BUN 24 (H) 01/26/2020    CO2 27 01/26/2020    TSH 1.06 07/23/2018    INR 1.0 01/26/2020     Provider Impression:   Paranoid Schizophrenia   - reside in secured dementia unit.  - No new issues or behaviors noted or reported.   - Psych is following, med adjustments per them (not on anything currently)    Hypothyroidism  - c/w Synthroid.   - Monitor TSH free T4 q6m and  PRN    H/o falls  #s/P fall with left radial fracture in January 2020  - Healed    [place]  - hearing aides in place    Vitamin Deficiency  - c/w supplementation    Glaucoma  - c/w medicated eye drops    Fluid/electrolyte/nutrition - Moderate Protein Calorie Malnutrition  - monitor weight  - continue with daily KcL 10meq  - continue monitor PO intake closely.   - dietician following  - labwork q6mths     Code Status  - DNRCCA    Disposition  - Pt resides on a secured memory care unit.  ----------------  Written by Marci Tan RN acting as a scribe for Dr. Santacruz. This note accurately reflects the work and decisions made by Dr. Santacruz.     I, Dr. Santacruz, attest all medical record entries made by the scribe were under my direction and were personally dictated by me. I have reviewed the chart and agree that the record accurately reflects my performance of the history, physical exam, and assessment and plan.     Electronically Signed By: Crista Castanon MD   10/15/24 11:48 AM

## 2024-09-26 VITALS
DIASTOLIC BLOOD PRESSURE: 74 MMHG | SYSTOLIC BLOOD PRESSURE: 130 MMHG | RESPIRATION RATE: 18 BRPM | WEIGHT: 118.6 LBS | HEART RATE: 74 BPM | OXYGEN SATURATION: 95 % | TEMPERATURE: 98.2 F

## 2024-09-26 PROBLEM — Z00.00 ENCOUNTER FOR ANNUAL WELLNESS VISIT (AWV) IN MEDICARE PATIENT: Status: ACTIVE | Noted: 2024-09-26

## 2024-09-26 ASSESSMENT — PATIENT HEALTH QUESTIONNAIRE - PHQ9
10. IF YOU CHECKED OFF ANY PROBLEMS, HOW DIFFICULT HAVE THESE PROBLEMS MADE IT FOR YOU TO DO YOUR WORK, TAKE CARE OF THINGS AT HOME, OR GET ALONG WITH OTHER PEOPLE: SOMEWHAT DIFFICULT
2. FEELING DOWN, DEPRESSED OR HOPELESS: SEVERAL DAYS
1. LITTLE INTEREST OR PLEASURE IN DOING THINGS: SEVERAL DAYS
SUM OF ALL RESPONSES TO PHQ9 QUESTIONS 1 AND 2: 2

## 2024-09-26 ASSESSMENT — ACTIVITIES OF DAILY LIVING (ADL)
TAKING_MEDICATION: NEEDS ASSISTANCE
DOING_HOUSEWORK: TOTAL CARE
BATHING: NEEDS ASSISTANCE
DRESSING: NEEDS ASSISTANCE
GROCERY_SHOPPING: TOTAL CARE
MANAGING_FINANCES: TOTAL CARE

## 2024-09-26 ASSESSMENT — ENCOUNTER SYMPTOMS
LOSS OF SENSATION IN FEET: 0
OCCASIONAL FEELINGS OF UNSTEADINESS: 1
DEPRESSION: 1

## 2024-09-26 NOTE — PROGRESS NOTES
Name: Ama Pierce  : 1930  MRN: [id number]  Visit Date: 2024  Chief Complaint: Monthly visit for management of chronic disease. Annual History and Physical. Annual Medicare Wellness Visit.     HPI: 89 y/o female who was admitted to geropsych unit at [facility] for increased delusions, significant weight loss, paranoia disorganization with concern for her safety due to lack of support. She remained on geropsych unit and was closely monitored, meds were adjusted and once she was stable. Psych rec 24 hour supervision, guardianship, supervised medication administration. Therefore, she was transferred to [facility] to reside on secured dementia unit to ensure safety, appropriate medical management.    Subjective: Seen and examined today. Sitting up in chair in the common area. Offers no complaints. Nursing reports no issues. Denies N/V/D/C/CP. No fever or chills.     I have reviewed nursing notes since my last visit and document any significant changes Reviewed orders, medications, Labs. Reviewed and updated Interval hx and meds reviewed. Reviewed chart looking at current medications, treatments, labs, x-rays etc.     ROS:  As above in subjective. Otherwise, all other systems have been reviewed and are negative for complaint.    Medications:  Current Outpatient Medications   Medication Instructions    acetaminophen (TYLENOL) 650 mg, oral, Every 6 hours PRN    bisacodyl (DULCOLAX) 10 mg, rectal, Once as needed    cholecalciferol (VITAMIN D-3) 125 mcg, oral, Daily    cranberry extract 425 mg capsule 1 capsule, oral, Daily    cycloSPORINE 0.05 % drops 2 drops, ophthalmic (eye), 2 times daily PRN    latanoprost (Xalatan) 0.005 % ophthalmic solution 1 drop, Right Eye, Nightly    levothyroxine (LEVOXYL) 88 mcg, oral, Daily before breakfast    magnesium hydroxide (Milk of Magnesia) 400 mg/5 mL suspension 30 mL, oral, Daily PRN    melatonin 1 mg tablet,disintegrating 1 tablet, oral, Daily    nystatin (Mycostatin)  100,000 unit/gram powder 1 Application, Topical, As needed    OLANZapine (ZyPREXA) 2.5 mg tablet 1 tablet, oral, Nightly    potassium chloride CR 10 mEq ER tablet 10 mEq, oral, Daily, Do not crush, chew, or split.    saccharomyces boulardii (FLORASTOR) 250 mg, oral, Daily      Visit Vitals  /74   Pulse 74   Temp 36.8 °C (98.2 °F)   Resp 18   Wt 53.8 kg (118 lb 9.6 oz)   SpO2 95%   Smoking Status Never      Past Medical History:   Diagnosis Date    Benign neoplasm of thyroid gland     Thyroid adenoma    Disease of thyroid gland     Exudative age-related macular degeneration, bilateral, stage unspecified (Multi) 01/04/2016    Age-related macular degeneration, wet, both eyes    Exudative age-related macular degeneration, left eye, stage unspecified (Multi) 10/26/2016    Age-related macular degeneration, wet, left eye    Exudative age-related macular degeneration, right eye, stage unspecified (Multi) 10/26/2016    Age-related macular degeneration, wet, right eye    Hyperopia of right eye with astigmatism and presbyopia 03/11/2024    Hypokalemia 03/11/2024    Non-Hodgkin lymphoma, unspecified, unspecified site (Multi)     Non-Hodgkin's lymphoma    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    Other conditions influencing health status     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    Other malaise 02/12/2021    Physical deconditioning    Other specified personal risk factors, not elsewhere classified 02/12/2021    At increased risk of exposure to COVID-19 virus    Personal history of other endocrine, nutritional and metabolic disease 06/15/2015    History of hypothyroidism    Personal history of other endocrine, nutritional and metabolic disease     History of thyroid disease    Personal history of other endocrine, nutritional and metabolic disease     History of hyperlipidemia    Personal history of other endocrine, nutritional and metabolic disease     History of hypothyroidism    Personal  history of other mental and behavioral disorders 06/08/2021    History of schizophrenia    Personal history of other mental and behavioral disorders 01/17/2020    History of anxiety    Personal history of other specified conditions 06/21/2020    History of chronic pain    Personal history of other specified conditions 01/17/2020    History of weakness    Personal history of other specified conditions     History of lymphadenopathy    Physical deconditioning 03/11/2024    Pseudophakia, both eyes 03/11/2024    Retinal neovascularization, unspecified, unspecified eye     Choroid neovascularization    Right radial head fracture 03/11/2024    Unspecified macular degeneration     Age-related macular degeneration      Past Surgical History:   Procedure Laterality Date    COLONOSCOPY  08/07/2013    Complete Colonoscopy    MOUTH SURGERY  05/12/2014    Oral Surgery Tooth Extraction    OTHER SURGICAL HISTORY  08/07/2013    Breast Surgery Puncture Aspiration Of Cyst    OTHER SURGICAL HISTORY  08/07/2013    Endotracheal Tube Insertion      Social History     Tobacco Use    Smoking status: Never    Smokeless tobacco: Never   Substance Use Topics    Alcohol use: Not Currently      Family History   Problem Relation Name Age of Onset    Hypertension Other        Allergies   Allergen Reactions    Atorvastatin Unknown    Ephedrine Unknown      Immunization History   Administered Date(s) Administered    Flu vaccine, trivalent, preservative free, HIGH-DOSE, age 65y+ (Fluzone) 10/19/2017    Influenza, Unspecified 10/15/2003, 11/16/2004, 10/19/2007, 11/03/2010, 09/17/2014    Influenza, seasonal, injectable 10/01/2012      Physical Exam:  Constitional: Elderly female pt. awake/alert x1-2, no distress. Very pleasant  Eye: EOMI, clear sclera  ENMT: mucous membranes moist, no apparent injury, no lesions seen  Head/Neck: Neck supple, no apparent injury, thyroid without mass or tenderness. No JVD, trachea midline, no bruits  Respiratory/Thorax:  Patent airways, CTAB, normal breath sounds with good chest expansion, thorax symmetric  Cardiovascular: Regular rate and rhythm, no murmurs, 2+ equal pulses of the extremites, normal S1 & S2  Gastrointestional: Nondistended, soft, non tender, no rebound tenderness or guarding, no masses palpable, no organomegaly, +BS  Genitourinary: Deferred  Musculoskeletal: ROM intact, no joint swelling  Extremities: GARCIA equally, peripheral pulses intact, no edema.   Neurological: Intact senses, motor, response and reflexes, normal strength  Lymphatic: No significant lymphadenopathy  Psychological: Appropriate mood and behavior   Skin: Warm and dry, no lesions, no rashes.    Results/Data:   Lab Results   Component Value Date    WBC 7.7 01/26/2020    HGB 13.1 01/26/2020    HCT 42.1 01/26/2020     01/26/2020    ALT 21 07/23/2018    AST 53 (H) 07/23/2018     01/26/2020    K 3.4 (L) 01/26/2020     01/26/2020    CREATININE 0.92 01/26/2020    BUN 24 (H) 01/26/2020    CO2 27 01/26/2020    TSH 1.06 07/23/2018    INR 1.0 01/26/2020     Provider Impression:   Paranoid Schizophrenia   - reside in secured dementia unit.  - No new issues or behaviors noted or reported.   - Psych is following, med adjustments per them (not on anything currently)    Hypothyroidism  - c/w Synthroid.   - Monitor TSH free T4 q6m and PRN    H/o falls  #s/P fall with left radial fracture in January 2020  - Healed    [place]  - hearing aides in place    Vitamin Deficiency  - c/w supplementation    Glaucoma  - c/w medicated eye drops    Fluid/electrolyte/nutrition - Moderate Protein Calorie Malnutrition  - monitor weight  - continue with daily KcL 10meq  - continue monitor PO intake closely.   - dietician following  - labwork q6mths     Code Status  - DNRCCA    Disposition  - Pt resides on a secured memory care unit.  ----------------  Written by Marci Tan RN acting as a scribe for Dr. Santacruz. This note accurately reflects the work and decisions  made by Dr. Santacruz.     I, Dr. Santacruz, attest all medical record entries made by the scribe were under my direction and were personally dictated by me. I have reviewed the chart and agree that the record accurately reflects my performance of the history, physical exam, and assessment and plan.

## 2024-10-13 NOTE — PROGRESS NOTES
Name: Ama Pierce  : 1930  MRN: [id number]  Visit Date: 2024  Chief Complaint: Monthly visit for management of chronic disease.     HPI: 89 y/o female who was admitted to geropsych unit at [facility] for increased delusions, significant weight loss, paranoia disorganization with concern for her safety due to lack of support. She remained on geropsych unit and was closely monitored, meds were adjusted and once she was stable. Psych rec 24 hour supervision, guardianship, supervised medication administration. Therefore, she was transferred to [facility] to reside on secured dementia unit to ensure safety, appropriate medical management.    Subjective: Seen and examined today. Sitting up in chair in the common area. Offers no complaints. Nursing reports no issues. Denies N/V/D/C/CP. No fever or chills.     I have reviewed nursing notes since my last visit and document any significant changes Reviewed orders, medications, Labs. Reviewed and updated Interval hx and meds reviewed. Reviewed chart looking at current medications, treatments, labs, x-rays etc.     ROS:  As above in subjective. Otherwise, all other systems have been reviewed and are negative for complaint.    Medications:  Current Outpatient Medications   Medication Instructions    acetaminophen (TYLENOL) 650 mg, oral, Every 6 hours PRN    bisacodyl (DULCOLAX) 10 mg, rectal, Once as needed    cholecalciferol (VITAMIN D-3) 125 mcg, oral, Daily    cranberry extract 425 mg capsule 1 capsule, oral, Daily    cycloSPORINE 0.05 % drops 2 drops, ophthalmic (eye), 2 times daily PRN    latanoprost (Xalatan) 0.005 % ophthalmic solution 1 drop, Right Eye, Nightly    levothyroxine (LEVOXYL) 88 mcg, oral, Daily before breakfast    magnesium hydroxide (Milk of Magnesia) 400 mg/5 mL suspension 30 mL, oral, Daily PRN    melatonin 1 mg tablet,disintegrating 1 tablet, oral, Daily    nystatin (Mycostatin) 100,000 unit/gram powder 1 Application, Topical, As needed     OLANZapine (ZyPREXA) 2.5 mg tablet 1 tablet, oral, Nightly    potassium chloride CR 10 mEq ER tablet 10 mEq, oral, Daily, Do not crush, chew, or split.    saccharomyces boulardii (FLORASTOR) 250 mg, oral, Daily      Physical Exam:  Constitional: Elderly female pt. awake/alert x1-2, no distress. Very pleasant  Eye: EOMI, clear sclera  ENMT: mucous membranes moist, no apparent injury, no lesions seen  Head/Neck: Neck supple, no apparent injury, thyroid without mass or tenderness. No JVD, trachea midline, no bruits  Respiratory/Thorax: Patent airways, CTAB, normal breath sounds with good chest expansion, thorax symmetric  Cardiovascular: Regular rate and rhythm, no murmurs, 2+ equal pulses of the extremites, normal S1 & S2  Gastrointestional: Nondistended, soft, non tender, no rebound tenderness or guarding, no masses palpable, no organomegaly, +BS  Genitourinary: Deferred  Musculoskeletal: ROM intact, no joint swelling  Extremities: GARCIA equally, peripheral pulses intact, no edema.   Neurological: Intact senses, motor, response and reflexes, normal strength  Lymphatic: No significant lymphadenopathy  Psychological: Appropriate mood and behavior   Skin: Warm and dry, no lesions, no rashes.    Results/Data:   Lab Results   Component Value Date    WBC 7.7 01/26/2020    HGB 13.1 01/26/2020    HCT 42.1 01/26/2020     01/26/2020    ALT 21 07/23/2018    AST 53 (H) 07/23/2018     01/26/2020    K 3.4 (L) 01/26/2020     01/26/2020    CREATININE 0.92 01/26/2020    BUN 24 (H) 01/26/2020    CO2 27 01/26/2020    TSH 1.06 07/23/2018    INR 1.0 01/26/2020     Provider Impression:   Paranoid Schizophrenia   - reside in secured dementia unit.  - No new issues or behaviors noted or reported.   - Psych is following, med adjustments per them (not on anything currently)    Hypothyroidism  - c/w Synthroid.   - Monitor TSH free T4 q6m and PRN    H/o falls  #s/P fall with left radial fracture in January 2020  -  Healed    [place]  - hearing aides in place    Vitamin Deficiency  - c/w supplementation    Glaucoma  - c/w medicated eye drops    Fluid/electrolyte/nutrition - Moderate Protein Calorie Malnutrition  - monitor weight  - continue with daily KcL 10meq  - continue monitor PO intake closely.   - dietician following  - [facility]     Code Status  - DNRCCA    Disposition  - Pt resides on a secured [facility] care unit.  ----------------  Written by Marci Tan RN acting as a scribe for Dr. Santacruz. This note accurately reflects the work and decisions made by Dr. Santacruz.     I, Dr. Santacruz, attest all medical record entries made by the scribe were under my direction and were personally dictated by me. I have reviewed the chart and agree that the record accurately reflects my performance of the history, physical exam, and assessment and plan.

## 2024-11-09 ENCOUNTER — NURSING HOME VISIT (OUTPATIENT)
Dept: POST ACUTE CARE | Facility: EXTERNAL LOCATION | Age: 89
End: 2024-11-09
Payer: COMMERCIAL

## 2024-11-09 DIAGNOSIS — F20.0 PARANOID SCHIZOPHRENIA (MULTI): ICD-10-CM

## 2024-11-09 DIAGNOSIS — E56.9 VITAMIN DEFICIENCY: ICD-10-CM

## 2024-11-09 DIAGNOSIS — Z00.00 ROUTINE GENERAL MEDICAL EXAMINATION AT A HEALTH CARE FACILITY: ICD-10-CM

## 2024-11-09 DIAGNOSIS — E44.0 MODERATE PROTEIN-CALORIE MALNUTRITION (MULTI): ICD-10-CM

## 2024-11-09 DIAGNOSIS — Z78.9 NURSING HOME RESIDENT: ICD-10-CM

## 2024-11-09 DIAGNOSIS — E03.9 HYPOTHYROIDISM, UNSPECIFIED TYPE: ICD-10-CM

## 2024-11-09 PROCEDURE — 99308 SBSQ NF CARE LOW MDM 20: CPT | Performed by: INTERNAL MEDICINE

## 2024-11-09 NOTE — LETTER
Patient: Ama Pierce  : 1930    Encounter Date: 2024    Name: Ama Pierce  : 1930  MRN: [id number]  Visit Date: 2024  Chief Complaint: Monthly visit for management of chronic disease.     HPI: 89 y/o female who was admitted to geropsych unit at [facility] for increased delusions, significant weight loss, paranoia disorganization with concern for her safety due to lack of support. She remained on geropsych unit and was closely monitored, meds were adjusted and once she was stable. Psych rec 24 hour supervision, guardianship, supervised medication administration. Therefore, she was transferred to [facility] to reside on secured dementia unit to ensure safety, appropriate medical management.    Subjective: Seen and examined today. Sitting up in chair in the common area. Offers no complaints. Nursing reports no issues. Denies N/V/D/C/CP. No fever or chills.     I have reviewed nursing notes since my last visit and document any significant changes Reviewed orders, medications, Labs. Reviewed and updated Interval hx and meds reviewed. Reviewed chart looking at current medications, treatments, labs, x-rays etc.     ROS:  As above in subjective. Otherwise, all other systems have been reviewed and are negative for complaint.    Medications:  Current Outpatient Medications   Medication Instructions   • acetaminophen (TYLENOL) 650 mg, oral, Every 6 hours PRN   • bisacodyl (DULCOLAX) 10 mg, rectal, Once as needed   • cholecalciferol (VITAMIN D-3) 125 mcg, oral, Daily   • cranberry extract 425 mg capsule 1 capsule, oral, Daily   • cycloSPORINE 0.05 % drops 2 drops, ophthalmic (eye), 2 times daily PRN   • latanoprost (Xalatan) 0.005 % ophthalmic solution 1 drop, Right Eye, Nightly   • levothyroxine (LEVOXYL) 88 mcg, oral, Daily before breakfast   • magnesium hydroxide (Milk of Magnesia) 400 mg/5 mL suspension 30 mL, oral, Daily PRN   • melatonin 1 mg tablet,disintegrating 1 tablet, oral, Daily   •  nystatin (Mycostatin) 100,000 unit/gram powder 1 Application, Topical, As needed   • OLANZapine (ZyPREXA) 2.5 mg tablet 1 tablet, oral, Nightly   • potassium chloride CR 10 mEq ER tablet 10 mEq, oral, Daily, Do not crush, chew, or split.   • saccharomyces boulardii (FLORASTOR) 250 mg, oral, Daily      Physical Exam:  Constitional: Elderly female pt. awake/alert x1-2, no distress. Very pleasant  Eye: EOMI, clear sclera  ENMT: mucous membranes moist, no apparent injury, no lesions seen  Head/Neck: Neck supple, no apparent injury, thyroid without mass or tenderness. No JVD, trachea midline, no bruits  Respiratory/Thorax: Patent airways, CTAB, normal breath sounds with good chest expansion, thorax symmetric  Cardiovascular: Regular rate and rhythm, no murmurs, 2+ equal pulses of the extremites, normal S1 & S2  Gastrointestional: Nondistended, soft, non tender, no rebound tenderness or guarding, no masses palpable, no organomegaly, +BS  Genitourinary: Deferred  Musculoskeletal: ROM intact, no joint swelling  Extremities: GARCIA equally, peripheral pulses intact, no edema.   Neurological: Intact senses, motor, response and reflexes, normal strength  Lymphatic: No significant lymphadenopathy  Psychological: Appropriate mood and behavior   Skin: Warm and dry, no lesions, no rashes.    Results/Data:   Lab Results   Component Value Date    WBC 7.7 01/26/2020    HGB 13.1 01/26/2020    HCT 42.1 01/26/2020     01/26/2020    ALT 21 07/23/2018    AST 53 (H) 07/23/2018     01/26/2020    K 3.4 (L) 01/26/2020     01/26/2020    CREATININE 0.92 01/26/2020    BUN 24 (H) 01/26/2020    CO2 27 01/26/2020    TSH 1.06 07/23/2018    INR 1.0 01/26/2020     Provider Impression:   Paranoid Schizophrenia   - reside in secured dementia unit.  - No new issues or behaviors noted or reported.   - Psych is following, med adjustments per them (not on anything currently)    Hypothyroidism  - c/w Synthroid.   - Monitor TSH free T4 q6m and  PRN    H/o falls  #s/P fall with left radial fracture in January 2020  - Healed    [place]  - hearing aides in place    Vitamin Deficiency  - c/w supplementation    Glaucoma  - c/w medicated eye drops    Fluid/electrolyte/nutrition - Moderate Protein Calorie Malnutrition  - monitor weight  - continue with daily KcL 10meq  - continue monitor PO intake closely.   - dietician following  - labwork q6mths     Code Status  - DNRCCA    Disposition  - Pt resides on a secured memory care unit.  ----------------  Written by Marci Tan RN acting as a scribe for Dr. Santacruz. This note accurately reflects the work and decisions made by Dr. Santacruz.     I, Dr. Santacruz, attest all medical record entries made by the scribe were under my direction and were personally dictated by me. I have reviewed the chart and agree that the record accurately reflects my performance of the history, physical exam, and assessment and plan.       Electronically Signed By: Crista Castanon MD   12/30/24  2:59 PM

## 2024-12-29 NOTE — PROGRESS NOTES
Name: Ama Pierce  : 1930  MRN: [id number]  Visit Date: 2024  Chief Complaint: Monthly visit for management of chronic disease.     HPI: 89 y/o female who was admitted to geropsych unit at [facility] for increased delusions, significant weight loss, paranoia disorganization with concern for her safety due to lack of support. She remained on geropsych unit and was closely monitored, meds were adjusted and once she was stable. Psych rec 24 hour supervision, guardianship, supervised medication administration. Therefore, she was transferred to [facility] to reside on secured dementia unit to ensure safety, appropriate medical management.    Subjective: Seen and examined today. Sitting up in chair in the common area. Offers no complaints. Nursing reports no issues. Denies N/V/D/C/CP. No fever or chills.     I have reviewed nursing notes since my last visit and document any significant changes Reviewed orders, medications, Labs. Reviewed and updated Interval hx and meds reviewed. Reviewed chart looking at current medications, treatments, labs, x-rays etc.     ROS:  As above in subjective. Otherwise, all other systems have been reviewed and are negative for complaint.    Medications:  Current Outpatient Medications   Medication Instructions    acetaminophen (TYLENOL) 650 mg, oral, Every 6 hours PRN    bisacodyl (DULCOLAX) 10 mg, rectal, Once as needed    cholecalciferol (VITAMIN D-3) 125 mcg, oral, Daily    cranberry extract 425 mg capsule 1 capsule, oral, Daily    cycloSPORINE 0.05 % drops 2 drops, ophthalmic (eye), 2 times daily PRN    latanoprost (Xalatan) 0.005 % ophthalmic solution 1 drop, Right Eye, Nightly    levothyroxine (LEVOXYL) 88 mcg, oral, Daily before breakfast    magnesium hydroxide (Milk of Magnesia) 400 mg/5 mL suspension 30 mL, oral, Daily PRN    melatonin 1 mg tablet,disintegrating 1 tablet, oral, Daily    nystatin (Mycostatin) 100,000 unit/gram powder 1 Application, Topical, As needed     OLANZapine (ZyPREXA) 2.5 mg tablet 1 tablet, oral, Nightly    potassium chloride CR 10 mEq ER tablet 10 mEq, oral, Daily, Do not crush, chew, or split.    saccharomyces boulardii (FLORASTOR) 250 mg, oral, Daily      Physical Exam:  Constitional: Elderly female pt. awake/alert x1-2, no distress. Very pleasant  Eye: EOMI, clear sclera  ENMT: mucous membranes moist, no apparent injury, no lesions seen  Head/Neck: Neck supple, no apparent injury, thyroid without mass or tenderness. No JVD, trachea midline, no bruits  Respiratory/Thorax: Patent airways, CTAB, normal breath sounds with good chest expansion, thorax symmetric  Cardiovascular: Regular rate and rhythm, no murmurs, 2+ equal pulses of the extremites, normal S1 & S2  Gastrointestional: Nondistended, soft, non tender, no rebound tenderness or guarding, no masses palpable, no organomegaly, +BS  Genitourinary: Deferred  Musculoskeletal: ROM intact, no joint swelling  Extremities: GARCIA equally, peripheral pulses intact, no edema.   Neurological: Intact senses, motor, response and reflexes, normal strength  Lymphatic: No significant lymphadenopathy  Psychological: Appropriate mood and behavior   Skin: Warm and dry, no lesions, no rashes.    Results/Data:   Lab Results   Component Value Date    WBC 7.7 01/26/2020    HGB 13.1 01/26/2020    HCT 42.1 01/26/2020     01/26/2020    ALT 21 07/23/2018    AST 53 (H) 07/23/2018     01/26/2020    K 3.4 (L) 01/26/2020     01/26/2020    CREATININE 0.92 01/26/2020    BUN 24 (H) 01/26/2020    CO2 27 01/26/2020    TSH 1.06 07/23/2018    INR 1.0 01/26/2020     Provider Impression:   Paranoid Schizophrenia   - reside in secured dementia unit.  - No new issues or behaviors noted or reported.   - Psych is following, med adjustments per them (not on anything currently)    Hypothyroidism  - c/w Synthroid.   - Monitor TSH free T4 q6m and PRN    H/o falls  #s/P fall with left radial fracture in January 2020  -  Healed    [place]  - hearing aides in place    Vitamin Deficiency  - c/w supplementation    Glaucoma  - c/w medicated eye drops    Fluid/electrolyte/nutrition - Moderate Protein Calorie Malnutrition  - monitor weight  - continue with daily KcL 10meq  - continue monitor PO intake closely.   - dietician following  - labwork q6[facility]     Code Status  - DNRCCA    Disposition  - Pt resides on a secured [facility] care unit.  ----------------  Written by Marci Tan RN acting as a scribe for Dr. Santacruz. This note accurately reflects the work and decisions made by Dr. Santacruz.     I, Dr. Santacruz, attest all medical record entries made by the scribe were under my direction and were personally dictated by me. I have reviewed the chart and agree that the record accurately reflects my performance of the history, physical exam, and assessment and plan.

## 2024-12-30 ENCOUNTER — APPOINTMENT (OUTPATIENT)
Dept: CARDIOLOGY | Facility: HOSPITAL | Age: 89
End: 2024-12-30
Payer: COMMERCIAL

## 2024-12-30 ENCOUNTER — APPOINTMENT (OUTPATIENT)
Dept: RADIOLOGY | Facility: HOSPITAL | Age: 89
End: 2024-12-30
Payer: COMMERCIAL

## 2024-12-30 ENCOUNTER — HOSPITAL ENCOUNTER (INPATIENT)
Facility: HOSPITAL | Age: 89
LOS: 1 days | Discharge: NURSING, REHAB OR LONG-TERM CARE | End: 2025-01-01
Attending: EMERGENCY MEDICINE | Admitting: INTERNAL MEDICINE
Payer: COMMERCIAL

## 2024-12-30 DIAGNOSIS — E86.0 DEHYDRATION: ICD-10-CM

## 2024-12-30 DIAGNOSIS — N17.9 AKI (ACUTE KIDNEY INJURY) (CMS-HCC): ICD-10-CM

## 2024-12-30 DIAGNOSIS — I63.9 CEREBROVASCULAR ACCIDENT (CVA), UNSPECIFIED MECHANISM (MULTI): ICD-10-CM

## 2024-12-30 DIAGNOSIS — E87.5 HYPERKALEMIA: Primary | ICD-10-CM

## 2024-12-30 PROBLEM — E87.0 HYPERNATREMIA: Status: ACTIVE | Noted: 2024-12-30

## 2024-12-30 LAB
ALBUMIN SERPL BCP-MCNC: 4.1 G/DL (ref 3.4–5)
ALP SERPL-CCNC: 79 U/L (ref 33–136)
ALT SERPL W P-5'-P-CCNC: 61 U/L (ref 7–45)
ANION GAP SERPL CALC-SCNC: 21 MMOL/L (ref 10–20)
AST SERPL W P-5'-P-CCNC: 62 U/L (ref 9–39)
BASOPHILS # BLD AUTO: 0.06 X10*3/UL (ref 0–0.1)
BASOPHILS NFR BLD AUTO: 0.5 %
BILIRUB SERPL-MCNC: 1.2 MG/DL (ref 0–1.2)
BUN SERPL-MCNC: 58 MG/DL (ref 6–23)
CALCIUM SERPL-MCNC: 9.6 MG/DL (ref 8.6–10.3)
CHLORIDE SERPL-SCNC: 113 MMOL/L (ref 98–107)
CO2 SERPL-SCNC: 26 MMOL/L (ref 21–32)
CREAT SERPL-MCNC: 1.35 MG/DL (ref 0.5–1.05)
EGFRCR SERPLBLD CKD-EPI 2021: 36 ML/MIN/1.73M*2
EOSINOPHIL # BLD AUTO: 0.01 X10*3/UL (ref 0–0.4)
EOSINOPHIL NFR BLD AUTO: 0.1 %
ERYTHROCYTE [DISTWIDTH] IN BLOOD BY AUTOMATED COUNT: 15 % (ref 11.5–14.5)
FLUAV RNA RESP QL NAA+PROBE: NOT DETECTED
FLUBV RNA RESP QL NAA+PROBE: NOT DETECTED
GLUCOSE SERPL-MCNC: 106 MG/DL (ref 74–99)
HCT VFR BLD AUTO: 50.1 % (ref 36–46)
HGB BLD-MCNC: 16.3 G/DL (ref 12–16)
IMM GRANULOCYTES # BLD AUTO: 0.12 X10*3/UL (ref 0–0.5)
IMM GRANULOCYTES NFR BLD AUTO: 1 % (ref 0–0.9)
LYMPHOCYTES # BLD AUTO: 0.84 X10*3/UL (ref 0.8–3)
LYMPHOCYTES NFR BLD AUTO: 7.3 %
MAGNESIUM SERPL-MCNC: 2.68 MG/DL (ref 1.6–2.4)
MCH RBC QN AUTO: 28.5 PG (ref 26–34)
MCHC RBC AUTO-ENTMCNC: 32.5 G/DL (ref 32–36)
MCV RBC AUTO: 88 FL (ref 80–100)
MONOCYTES # BLD AUTO: 0.87 X10*3/UL (ref 0.05–0.8)
MONOCYTES NFR BLD AUTO: 7.6 %
NEUTROPHILS # BLD AUTO: 9.55 X10*3/UL (ref 1.6–5.5)
NEUTROPHILS NFR BLD AUTO: 83.5 %
NRBC BLD-RTO: 0 /100 WBCS (ref 0–0)
PHOSPHATE SERPL-MCNC: 4 MG/DL (ref 2.5–4.9)
PLATELET # BLD AUTO: 217 X10*3/UL (ref 150–450)
POTASSIUM SERPL-SCNC: 3.8 MMOL/L (ref 3.5–5.3)
PROT SERPL-MCNC: 8.1 G/DL (ref 6.4–8.2)
RBC # BLD AUTO: 5.72 X10*6/UL (ref 4–5.2)
RSV RNA RESP QL NAA+PROBE: NOT DETECTED
SARS-COV-2 RNA RESP QL NAA+PROBE: NOT DETECTED
SODIUM SERPL-SCNC: 156 MMOL/L (ref 136–145)
WBC # BLD AUTO: 11.5 X10*3/UL (ref 4.4–11.3)

## 2024-12-30 PROCEDURE — 36415 COLL VENOUS BLD VENIPUNCTURE: CPT | Performed by: EMERGENCY MEDICINE

## 2024-12-30 PROCEDURE — 99285 EMERGENCY DEPT VISIT HI MDM: CPT | Performed by: EMERGENCY MEDICINE

## 2024-12-30 PROCEDURE — 83735 ASSAY OF MAGNESIUM: CPT | Performed by: EMERGENCY MEDICINE

## 2024-12-30 PROCEDURE — 99223 1ST HOSP IP/OBS HIGH 75: CPT | Performed by: INTERNAL MEDICINE

## 2024-12-30 PROCEDURE — 71045 X-RAY EXAM CHEST 1 VIEW: CPT | Mod: FOREIGN READ | Performed by: RADIOLOGY

## 2024-12-30 PROCEDURE — 93005 ELECTROCARDIOGRAM TRACING: CPT

## 2024-12-30 PROCEDURE — 71045 X-RAY EXAM CHEST 1 VIEW: CPT

## 2024-12-30 PROCEDURE — 87637 SARSCOV2&INF A&B&RSV AMP PRB: CPT | Performed by: EMERGENCY MEDICINE

## 2024-12-30 PROCEDURE — 85025 COMPLETE CBC W/AUTO DIFF WBC: CPT | Performed by: EMERGENCY MEDICINE

## 2024-12-30 PROCEDURE — 2500000004 HC RX 250 GENERAL PHARMACY W/ HCPCS (ALT 636 FOR OP/ED): Performed by: EMERGENCY MEDICINE

## 2024-12-30 PROCEDURE — 84100 ASSAY OF PHOSPHORUS: CPT | Performed by: EMERGENCY MEDICINE

## 2024-12-30 PROCEDURE — 80053 COMPREHEN METABOLIC PANEL: CPT | Performed by: EMERGENCY MEDICINE

## 2024-12-30 RX ADMIN — SODIUM CHLORIDE 1000 ML: 9 INJECTION, SOLUTION INTRAVENOUS at 23:32

## 2024-12-30 ASSESSMENT — PAIN SCALES - GENERAL
PAINLEVEL_OUTOF10: 0 - NO PAIN
PAINLEVEL_OUTOF10: 0 - NO PAIN

## 2024-12-30 ASSESSMENT — COLUMBIA-SUICIDE SEVERITY RATING SCALE - C-SSRS
2. HAVE YOU ACTUALLY HAD ANY THOUGHTS OF KILLING YOURSELF?: NO
6. HAVE YOU EVER DONE ANYTHING, STARTED TO DO ANYTHING, OR PREPARED TO DO ANYTHING TO END YOUR LIFE?: NO
1. IN THE PAST MONTH, HAVE YOU WISHED YOU WERE DEAD OR WISHED YOU COULD GO TO SLEEP AND NOT WAKE UP?: NO

## 2024-12-30 ASSESSMENT — LIFESTYLE VARIABLES
HAVE YOU EVER FELT YOU SHOULD CUT DOWN ON YOUR DRINKING: NO
EVER HAD A DRINK FIRST THING IN THE MORNING TO STEADY YOUR NERVES TO GET RID OF A HANGOVER: NO
EVER FELT BAD OR GUILTY ABOUT YOUR DRINKING: NO
TOTAL SCORE: 0
HAVE PEOPLE ANNOYED YOU BY CRITICIZING YOUR DRINKING: NO

## 2024-12-30 ASSESSMENT — PAIN DESCRIPTION - PROGRESSION: CLINICAL_PROGRESSION: NOT CHANGED

## 2024-12-30 ASSESSMENT — PAIN - FUNCTIONAL ASSESSMENT
PAIN_FUNCTIONAL_ASSESSMENT: 0-10
PAIN_FUNCTIONAL_ASSESSMENT: 0-10

## 2024-12-31 ENCOUNTER — APPOINTMENT (OUTPATIENT)
Dept: RADIOLOGY | Facility: HOSPITAL | Age: 89
End: 2024-12-31
Payer: COMMERCIAL

## 2024-12-31 PROBLEM — E87.5 HYPERKALEMIA: Status: ACTIVE | Noted: 2024-12-31

## 2024-12-31 LAB
ANION GAP SERPL CALC-SCNC: 15 MMOL/L (ref 10–20)
APPEARANCE UR: ABNORMAL
BACTERIA #/AREA URNS AUTO: ABNORMAL /HPF
BILIRUB UR STRIP.AUTO-MCNC: NEGATIVE MG/DL
BNP SERPL-MCNC: 105 PG/ML (ref 0–99)
BUN SERPL-MCNC: 48 MG/DL (ref 6–23)
CALCIUM SERPL-MCNC: 8.5 MG/DL (ref 8.6–10.3)
CHLORIDE SERPL-SCNC: 118 MMOL/L (ref 98–107)
CHOLEST SERPL-MCNC: 180 MG/DL (ref 0–199)
CHOLESTEROL/HDL RATIO: 4.8
CO2 SERPL-SCNC: 25 MMOL/L (ref 21–32)
COLOR UR: YELLOW
CREAT SERPL-MCNC: 1.09 MG/DL (ref 0.5–1.05)
EGFRCR SERPLBLD CKD-EPI 2021: 47 ML/MIN/1.73M*2
ERYTHROCYTE [DISTWIDTH] IN BLOOD BY AUTOMATED COUNT: 14.6 % (ref 11.5–14.5)
EST. AVERAGE GLUCOSE BLD GHB EST-MCNC: 114 MG/DL
GLUCOSE BLD MANUAL STRIP-MCNC: 97 MG/DL (ref 74–99)
GLUCOSE SERPL-MCNC: 130 MG/DL (ref 74–99)
GLUCOSE UR STRIP.AUTO-MCNC: NORMAL MG/DL
HBA1C MFR BLD: 5.6 %
HCT VFR BLD AUTO: 45.2 % (ref 36–46)
HDLC SERPL-MCNC: 37.2 MG/DL
HGB BLD-MCNC: 14.1 G/DL (ref 12–16)
HOLD SPECIMEN: NORMAL
KETONES UR STRIP.AUTO-MCNC: NEGATIVE MG/DL
LDLC SERPL CALC-MCNC: 125 MG/DL
LEUKOCYTE ESTERASE UR QL STRIP.AUTO: ABNORMAL
MAGNESIUM SERPL-MCNC: 2.5 MG/DL (ref 1.6–2.4)
MCH RBC QN AUTO: 27.6 PG (ref 26–34)
MCHC RBC AUTO-ENTMCNC: 31.2 G/DL (ref 32–36)
MCV RBC AUTO: 89 FL (ref 80–100)
MUCOUS THREADS #/AREA URNS AUTO: ABNORMAL /LPF
NITRITE UR QL STRIP.AUTO: ABNORMAL
NON HDL CHOLESTEROL: 143 MG/DL (ref 0–149)
NRBC BLD-RTO: 0 /100 WBCS (ref 0–0)
PH UR STRIP.AUTO: 6 [PH]
PLATELET # BLD AUTO: 160 X10*3/UL (ref 150–450)
POTASSIUM SERPL-SCNC: 3.5 MMOL/L (ref 3.5–5.3)
PROT UR STRIP.AUTO-MCNC: ABNORMAL MG/DL
RBC # BLD AUTO: 5.1 X10*6/UL (ref 4–5.2)
RBC # UR STRIP.AUTO: NEGATIVE /UL
RBC #/AREA URNS AUTO: ABNORMAL /HPF
SODIUM SERPL-SCNC: 154 MMOL/L (ref 136–145)
SP GR UR STRIP.AUTO: 1.03
T4 FREE SERPL-MCNC: 1.04 NG/DL (ref 0.61–1.12)
TRIGL SERPL-MCNC: 91 MG/DL (ref 0–149)
TSH SERPL-ACNC: 5.07 MIU/L (ref 0.44–3.98)
UROBILINOGEN UR STRIP.AUTO-MCNC: ABNORMAL MG/DL
VLDL: 18 MG/DL (ref 0–40)
WBC # BLD AUTO: 10.2 X10*3/UL (ref 4.4–11.3)
WBC #/AREA URNS AUTO: ABNORMAL /HPF
YEAST BUDDING #/AREA UR COMP ASSIST: PRESENT /HPF

## 2024-12-31 PROCEDURE — 83036 HEMOGLOBIN GLYCOSYLATED A1C: CPT | Mod: GEALAB | Performed by: PHYSICIAN ASSISTANT

## 2024-12-31 PROCEDURE — 1200000002 HC GENERAL ROOM WITH TELEMETRY DAILY

## 2024-12-31 PROCEDURE — 70450 CT HEAD/BRAIN W/O DYE: CPT | Performed by: RADIOLOGY

## 2024-12-31 PROCEDURE — 80061 LIPID PANEL: CPT | Performed by: PHYSICIAN ASSISTANT

## 2024-12-31 PROCEDURE — 2500000001 HC RX 250 WO HCPCS SELF ADMINISTERED DRUGS (ALT 637 FOR MEDICARE OP): Performed by: PHYSICIAN ASSISTANT

## 2024-12-31 PROCEDURE — 92610 EVALUATE SWALLOWING FUNCTION: CPT | Mod: GN

## 2024-12-31 PROCEDURE — 2500000001 HC RX 250 WO HCPCS SELF ADMINISTERED DRUGS (ALT 637 FOR MEDICARE OP): Performed by: INTERNAL MEDICINE

## 2024-12-31 PROCEDURE — 87186 SC STD MICRODIL/AGAR DIL: CPT | Mod: GEALAB | Performed by: EMERGENCY MEDICINE

## 2024-12-31 PROCEDURE — 81001 URINALYSIS AUTO W/SCOPE: CPT | Performed by: EMERGENCY MEDICINE

## 2024-12-31 PROCEDURE — 80048 BASIC METABOLIC PNL TOTAL CA: CPT | Performed by: INTERNAL MEDICINE

## 2024-12-31 PROCEDURE — 96360 HYDRATION IV INFUSION INIT: CPT | Mod: 59

## 2024-12-31 PROCEDURE — 82947 ASSAY GLUCOSE BLOOD QUANT: CPT

## 2024-12-31 PROCEDURE — 84443 ASSAY THYROID STIM HORMONE: CPT | Performed by: PHYSICIAN ASSISTANT

## 2024-12-31 PROCEDURE — 2500000005 HC RX 250 GENERAL PHARMACY W/O HCPCS: Performed by: INTERNAL MEDICINE

## 2024-12-31 PROCEDURE — 84439 ASSAY OF FREE THYROXINE: CPT | Performed by: PHYSICIAN ASSISTANT

## 2024-12-31 PROCEDURE — 2500000004 HC RX 250 GENERAL PHARMACY W/ HCPCS (ALT 636 FOR OP/ED): Performed by: PHYSICIAN ASSISTANT

## 2024-12-31 PROCEDURE — 2500000002 HC RX 250 W HCPCS SELF ADMINISTERED DRUGS (ALT 637 FOR MEDICARE OP, ALT 636 FOR OP/ED): Performed by: INTERNAL MEDICINE

## 2024-12-31 PROCEDURE — 83880 ASSAY OF NATRIURETIC PEPTIDE: CPT | Performed by: PHYSICIAN ASSISTANT

## 2024-12-31 PROCEDURE — 85027 COMPLETE CBC AUTOMATED: CPT | Performed by: INTERNAL MEDICINE

## 2024-12-31 PROCEDURE — 83735 ASSAY OF MAGNESIUM: CPT | Performed by: PHYSICIAN ASSISTANT

## 2024-12-31 PROCEDURE — 36415 COLL VENOUS BLD VENIPUNCTURE: CPT | Performed by: INTERNAL MEDICINE

## 2024-12-31 PROCEDURE — 70450 CT HEAD/BRAIN W/O DYE: CPT

## 2024-12-31 PROCEDURE — 2500000004 HC RX 250 GENERAL PHARMACY W/ HCPCS (ALT 636 FOR OP/ED): Performed by: INTERNAL MEDICINE

## 2024-12-31 PROCEDURE — 96372 THER/PROPH/DIAG INJ SC/IM: CPT | Performed by: INTERNAL MEDICINE

## 2024-12-31 RX ORDER — CLOPIDOGREL BISULFATE 75 MG/1
300 TABLET ORAL ONCE
Status: COMPLETED | OUTPATIENT
Start: 2024-12-31 | End: 2024-12-31

## 2024-12-31 RX ORDER — DEXTROSE MONOHYDRATE 50 MG/ML
75 INJECTION, SOLUTION INTRAVENOUS CONTINUOUS
Status: ACTIVE | OUTPATIENT
Start: 2024-12-31 | End: 2025-01-01

## 2024-12-31 RX ORDER — NAPROXEN SODIUM 220 MG/1
81 TABLET, FILM COATED ORAL DAILY
Status: DISCONTINUED | OUTPATIENT
Start: 2024-12-31 | End: 2025-01-01 | Stop reason: HOSPADM

## 2024-12-31 RX ORDER — ACETAMINOPHEN 650 MG/1
650 SUPPOSITORY RECTAL EVERY 4 HOURS PRN
Status: DISCONTINUED | OUTPATIENT
Start: 2024-12-31 | End: 2025-01-01 | Stop reason: HOSPADM

## 2024-12-31 RX ORDER — ONDANSETRON 4 MG/1
4 TABLET, FILM COATED ORAL EVERY 8 HOURS PRN
Status: DISCONTINUED | OUTPATIENT
Start: 2024-12-31 | End: 2025-01-01 | Stop reason: HOSPADM

## 2024-12-31 RX ORDER — BISACODYL 10 MG/1
10 SUPPOSITORY RECTAL ONCE AS NEEDED
Status: DISCONTINUED | OUTPATIENT
Start: 2024-12-31 | End: 2025-01-01 | Stop reason: HOSPADM

## 2024-12-31 RX ORDER — GUAIFENESIN/DEXTROMETHORPHAN 100-10MG/5
5 SYRUP ORAL EVERY 4 HOURS PRN
Status: DISCONTINUED | OUTPATIENT
Start: 2024-12-31 | End: 2025-01-01 | Stop reason: HOSPADM

## 2024-12-31 RX ORDER — HYDRALAZINE HYDROCHLORIDE 20 MG/ML
10 INJECTION INTRAMUSCULAR; INTRAVENOUS
Status: DISCONTINUED | OUTPATIENT
Start: 2024-12-31 | End: 2025-01-01 | Stop reason: HOSPADM

## 2024-12-31 RX ORDER — ENOXAPARIN SODIUM 100 MG/ML
30 INJECTION SUBCUTANEOUS EVERY 24 HOURS
Status: DISCONTINUED | OUTPATIENT
Start: 2024-12-31 | End: 2025-01-01 | Stop reason: HOSPADM

## 2024-12-31 RX ORDER — LABETALOL HYDROCHLORIDE 5 MG/ML
10 INJECTION, SOLUTION INTRAVENOUS EVERY 10 MIN PRN
Status: DISCONTINUED | OUTPATIENT
Start: 2024-12-31 | End: 2025-01-01 | Stop reason: HOSPADM

## 2024-12-31 RX ORDER — LATANOPROST 50 UG/ML
1 SOLUTION/ DROPS OPHTHALMIC NIGHTLY
Status: DISCONTINUED | OUTPATIENT
Start: 2024-12-31 | End: 2025-01-01 | Stop reason: HOSPADM

## 2024-12-31 RX ORDER — CHOLECALCIFEROL (VITAMIN D3) 1250 MCG
50000 TABLET ORAL
COMMUNITY
End: 2025-01-01 | Stop reason: HOSPADM

## 2024-12-31 RX ORDER — GUAIFENESIN 600 MG/1
600 TABLET, EXTENDED RELEASE ORAL EVERY 12 HOURS PRN
Status: DISCONTINUED | OUTPATIENT
Start: 2024-12-31 | End: 2025-01-01 | Stop reason: HOSPADM

## 2024-12-31 RX ORDER — CLOPIDOGREL BISULFATE 75 MG/1
75 TABLET ORAL DAILY
Status: DISCONTINUED | OUTPATIENT
Start: 2025-01-01 | End: 2025-01-01 | Stop reason: HOSPADM

## 2024-12-31 RX ORDER — CHOLECALCIFEROL (VITAMIN D3) 25 MCG
2000 TABLET ORAL DAILY
Status: DISCONTINUED | OUTPATIENT
Start: 2024-12-31 | End: 2025-01-01 | Stop reason: HOSPADM

## 2024-12-31 RX ORDER — ONDANSETRON HYDROCHLORIDE 2 MG/ML
4 INJECTION, SOLUTION INTRAVENOUS EVERY 8 HOURS PRN
Status: DISCONTINUED | OUTPATIENT
Start: 2024-12-31 | End: 2025-01-01 | Stop reason: HOSPADM

## 2024-12-31 RX ORDER — HYDRALAZINE HYDROCHLORIDE 25 MG/1
25 TABLET, FILM COATED ORAL EVERY 6 HOURS PRN
Status: DISCONTINUED | OUTPATIENT
Start: 2025-01-02 | End: 2025-01-01 | Stop reason: HOSPADM

## 2024-12-31 RX ORDER — ACETAMINOPHEN 325 MG/1
650 TABLET ORAL EVERY 4 HOURS PRN
Status: DISCONTINUED | OUTPATIENT
Start: 2024-12-31 | End: 2025-01-01 | Stop reason: HOSPADM

## 2024-12-31 RX ORDER — VIT C/E/ZN/COPPR/LUTEIN/ZEAXAN 250MG-90MG
5000 CAPSULE ORAL DAILY
Status: DISCONTINUED | OUTPATIENT
Start: 2024-12-31 | End: 2024-12-31

## 2024-12-31 RX ORDER — LEVOTHYROXINE SODIUM 88 UG/1
88 TABLET ORAL
Status: DISCONTINUED | OUTPATIENT
Start: 2024-12-31 | End: 2025-01-01 | Stop reason: HOSPADM

## 2024-12-31 RX ORDER — OLANZAPINE 5 MG/1
2.5 TABLET ORAL NIGHTLY
Status: DISCONTINUED | OUTPATIENT
Start: 2024-12-31 | End: 2025-01-01 | Stop reason: HOSPADM

## 2024-12-31 RX ORDER — POLYETHYLENE GLYCOL 3350 17 G/17G
17 POWDER, FOR SOLUTION ORAL DAILY
Status: DISCONTINUED | OUTPATIENT
Start: 2024-12-31 | End: 2025-01-01 | Stop reason: HOSPADM

## 2024-12-31 RX ORDER — ADHESIVE BANDAGE
30 BANDAGE TOPICAL DAILY PRN
Status: DISCONTINUED | OUTPATIENT
Start: 2024-12-31 | End: 2025-01-01 | Stop reason: HOSPADM

## 2024-12-31 RX ORDER — ACETAMINOPHEN 160 MG/5ML
650 SOLUTION ORAL EVERY 4 HOURS PRN
Status: DISCONTINUED | OUTPATIENT
Start: 2024-12-31 | End: 2025-01-01 | Stop reason: HOSPADM

## 2024-12-31 RX ORDER — CYCLOSPORINE 0.5 MG/ML
1 EMULSION OPHTHALMIC
Status: DISCONTINUED | OUTPATIENT
Start: 2024-12-31 | End: 2025-01-01 | Stop reason: HOSPADM

## 2024-12-31 RX ORDER — BUTYROSPERMUM PARKII(SHEA BUTTER), SIMMONDSIA CHINENSIS (JOJOBA) SEED OIL, ALOE BARBADENSIS LEAF EXTRACT .01; 1; 3.5 G/100G; G/100G; G/100G
250 LIQUID TOPICAL DAILY
Status: DISCONTINUED | OUTPATIENT
Start: 2024-12-31 | End: 2025-01-01 | Stop reason: HOSPADM

## 2024-12-31 RX ADMIN — ASPIRIN 81 MG 81 MG: 81 TABLET ORAL at 12:28

## 2024-12-31 RX ADMIN — Medication 2000 UNITS: at 09:42

## 2024-12-31 RX ADMIN — OLANZAPINE 2.5 MG: 5 TABLET, FILM COATED ORAL at 20:06

## 2024-12-31 RX ADMIN — Medication 1 CAPSULE: at 09:42

## 2024-12-31 RX ADMIN — Medication 250 MG: at 09:42

## 2024-12-31 RX ADMIN — POLYETHYLENE GLYCOL 3350 17 G: 17 POWDER, FOR SOLUTION ORAL at 12:28

## 2024-12-31 RX ADMIN — Medication 2 L/MIN: at 20:16

## 2024-12-31 RX ADMIN — DEXTROSE MONOHYDRATE 75 ML/HR: 50 INJECTION, SOLUTION INTRAVENOUS at 01:39

## 2024-12-31 RX ADMIN — OLANZAPINE 2.5 MG: 5 TABLET, FILM COATED ORAL at 02:00

## 2024-12-31 RX ADMIN — Medication 2 L/MIN: at 07:51

## 2024-12-31 RX ADMIN — CLOPIDOGREL 300 MG: 75 TABLET ORAL at 12:28

## 2024-12-31 RX ADMIN — LATANOPROST 1 DROP: 50 SOLUTION OPHTHALMIC at 20:06

## 2024-12-31 RX ADMIN — Medication 2 L/MIN: at 01:24

## 2024-12-31 RX ADMIN — LATANOPROST 1 DROP: 50 SOLUTION OPHTHALMIC at 02:00

## 2024-12-31 RX ADMIN — LEVOTHYROXINE SODIUM 88 MCG: 0.09 TABLET ORAL at 07:40

## 2024-12-31 RX ADMIN — ENOXAPARIN SODIUM 30 MG: 100 INJECTION SUBCUTANEOUS at 09:42

## 2024-12-31 RX ADMIN — CYCLOSPORINE 1 DROP: 0.5 EMULSION OPHTHALMIC at 18:13

## 2024-12-31 RX ADMIN — DEXTROSE MONOHYDRATE 75 ML/HR: 50 INJECTION, SOLUTION INTRAVENOUS at 15:31

## 2024-12-31 RX ADMIN — Medication 2 L/MIN: at 16:33

## 2024-12-31 RX ADMIN — CYCLOSPORINE 1 DROP: 0.5 EMULSION OPHTHALMIC at 07:42

## 2024-12-31 SDOH — SOCIAL STABILITY: SOCIAL INSECURITY: DO YOU FEEL ANYONE HAS EXPLOITED OR TAKEN ADVANTAGE OF YOU FINANCIALLY OR OF YOUR PERSONAL PROPERTY?: UNABLE TO ASSESS

## 2024-12-31 SDOH — ECONOMIC STABILITY: HOUSING INSECURITY: IN THE PAST 12 MONTHS, HOW MANY TIMES HAVE YOU MOVED WHERE YOU WERE LIVING?: 0

## 2024-12-31 SDOH — ECONOMIC STABILITY: FOOD INSECURITY
WITHIN THE PAST 12 MONTHS, YOU WORRIED THAT YOUR FOOD WOULD RUN OUT BEFORE YOU GOT THE MONEY TO BUY MORE.: PATIENT UNABLE TO ANSWER

## 2024-12-31 SDOH — SOCIAL STABILITY: SOCIAL INSECURITY: HAVE YOU HAD ANY THOUGHTS OF HARMING ANYONE ELSE?: UNABLE TO ASSESS

## 2024-12-31 SDOH — SOCIAL STABILITY: SOCIAL INSECURITY: ABUSE: ADULT

## 2024-12-31 SDOH — SOCIAL STABILITY: SOCIAL INSECURITY: ARE THERE ANY APPARENT SIGNS OF INJURIES/BEHAVIORS THAT COULD BE RELATED TO ABUSE/NEGLECT?: UNABLE TO ASSESS

## 2024-12-31 SDOH — SOCIAL STABILITY: SOCIAL INSECURITY: DOES ANYONE TRY TO KEEP YOU FROM HAVING/CONTACTING OTHER FRIENDS OR DOING THINGS OUTSIDE YOUR HOME?: UNABLE TO ASSESS

## 2024-12-31 SDOH — SOCIAL STABILITY: SOCIAL INSECURITY: ARE YOU OR HAVE YOU BEEN THREATENED OR ABUSED PHYSICALLY, EMOTIONALLY, OR SEXUALLY BY ANYONE?: UNABLE TO ASSESS

## 2024-12-31 SDOH — SOCIAL STABILITY: SOCIAL INSECURITY: HAVE YOU HAD THOUGHTS OF HARMING ANYONE ELSE?: UNABLE TO ASSESS

## 2024-12-31 SDOH — SOCIAL STABILITY: SOCIAL INSECURITY
WITHIN THE LAST YEAR, HAVE YOU BEEN KICKED, HIT, SLAPPED, OR OTHERWISE PHYSICALLY HURT BY YOUR PARTNER OR EX-PARTNER?: PATIENT UNABLE TO ANSWER

## 2024-12-31 SDOH — SOCIAL STABILITY: SOCIAL INSECURITY: DO YOU FEEL UNSAFE GOING BACK TO THE PLACE WHERE YOU ARE LIVING?: UNABLE TO ASSESS

## 2024-12-31 SDOH — SOCIAL STABILITY: SOCIAL INSECURITY
WITHIN THE LAST YEAR, HAVE YOU BEEN RAPED OR FORCED TO HAVE ANY KIND OF SEXUAL ACTIVITY BY YOUR PARTNER OR EX-PARTNER?: PATIENT UNABLE TO ANSWER

## 2024-12-31 SDOH — SOCIAL STABILITY: SOCIAL INSECURITY: HAS ANYONE EVER THREATENED TO HURT YOUR FAMILY OR YOUR PETS?: UNABLE TO ASSESS

## 2024-12-31 SDOH — ECONOMIC STABILITY: FOOD INSECURITY
WITHIN THE PAST 12 MONTHS, THE FOOD YOU BOUGHT JUST DIDN'T LAST AND YOU DIDN'T HAVE MONEY TO GET MORE.: PATIENT UNABLE TO ANSWER

## 2024-12-31 SDOH — ECONOMIC STABILITY: INCOME INSECURITY
IN THE PAST 12 MONTHS HAS THE ELECTRIC, GAS, OIL, OR WATER COMPANY THREATENED TO SHUT OFF SERVICES IN YOUR HOME?: PATIENT UNABLE TO ANSWER

## 2024-12-31 SDOH — SOCIAL STABILITY: SOCIAL INSECURITY: WERE YOU ABLE TO COMPLETE ALL THE BEHAVIORAL HEALTH SCREENINGS?: YES

## 2024-12-31 SDOH — SOCIAL STABILITY: SOCIAL INSECURITY: WITHIN THE LAST YEAR, HAVE YOU BEEN AFRAID OF YOUR PARTNER OR EX-PARTNER?: PATIENT UNABLE TO ANSWER

## 2024-12-31 SDOH — SOCIAL STABILITY: SOCIAL INSECURITY
WITHIN THE LAST YEAR, HAVE YOU BEEN HUMILIATED OR EMOTIONALLY ABUSED IN OTHER WAYS BY YOUR PARTNER OR EX-PARTNER?: PATIENT UNABLE TO ANSWER

## 2024-12-31 ASSESSMENT — ACTIVITIES OF DAILY LIVING (ADL)
ADEQUATE_TO_COMPLETE_ADL: UNABLE TO ASSESS
HEARING - LEFT EAR: HEARING AID
LACK_OF_TRANSPORTATION: PATIENT UNABLE TO ANSWER
LACK_OF_TRANSPORTATION: NO
JUDGMENT_ADEQUATE_SAFELY_COMPLETE_DAILY_ACTIVITIES: NO
FEEDING YOURSELF: NEEDS ASSISTANCE
PATIENT'S MEMORY ADEQUATE TO SAFELY COMPLETE DAILY ACTIVITIES?: NO
TOILETING: NEEDS ASSISTANCE
GROOMING: DEPENDENT
HEARING - RIGHT EAR: HEARING AID
DRESSING YOURSELF: DEPENDENT
BATHING: NEEDS ASSISTANCE
WALKS IN HOME: UNABLE TO ASSESS
ASSISTIVE_DEVICE: HEARING AID - LEFT;HEARING AID - RIGHT

## 2024-12-31 ASSESSMENT — COGNITIVE AND FUNCTIONAL STATUS - GENERAL
TURNING FROM BACK TO SIDE WHILE IN FLAT BAD: A LITTLE
HELP NEEDED FOR BATHING: A LOT
MOVING TO AND FROM BED TO CHAIR: A LOT
DAILY ACTIVITIY SCORE: 12
PERSONAL GROOMING: A LOT
STANDING UP FROM CHAIR USING ARMS: A LOT
PATIENT BASELINE BEDBOUND: UNABLE TO ASSESS AT THIS TIME
MOBILITY SCORE: 11
DRESSING REGULAR UPPER BODY CLOTHING: A LOT
EATING MEALS: A LITTLE
TOILETING: TOTAL
WALKING IN HOSPITAL ROOM: TOTAL
MOVING FROM LYING ON BACK TO SITTING ON SIDE OF FLAT BED WITH BEDRAILS: A LOT
DRESSING REGULAR LOWER BODY CLOTHING: A LOT
CLIMB 3 TO 5 STEPS WITH RAILING: TOTAL

## 2024-12-31 ASSESSMENT — ENCOUNTER SYMPTOMS
CONFUSION: 1
DIARRHEA: 0
ABDOMINAL PAIN: 0
BACK PAIN: 0
FEVER: 0
SHORTNESS OF BREATH: 0

## 2024-12-31 ASSESSMENT — PAIN SCALES - PAIN ASSESSMENT IN ADVANCED DEMENTIA (PAINAD)
BREATHING: SMILING OR INEXPRESSIVE
TOTALSCORE: NO NEED TO CONSOLE
BODYLANGUAGE: RELAXED
BREATHING: NORMAL
TOTALSCORE: 0

## 2024-12-31 ASSESSMENT — LIFESTYLE VARIABLES
AUDIT-C TOTAL SCORE: -1
HOW OFTEN DO YOU HAVE 6 OR MORE DRINKS ON ONE OCCASION: PATIENT UNABLE TO ANSWER
SKIP TO QUESTIONS 9-10: 0
HOW OFTEN DO YOU HAVE A DRINK CONTAINING ALCOHOL: PATIENT UNABLE TO ANSWER
AUDIT-C TOTAL SCORE: -1
HOW MANY STANDARD DRINKS CONTAINING ALCOHOL DO YOU HAVE ON A TYPICAL DAY: PATIENT UNABLE TO ANSWER

## 2024-12-31 ASSESSMENT — PATIENT HEALTH QUESTIONNAIRE - PHQ9
1. LITTLE INTEREST OR PLEASURE IN DOING THINGS: SEVERAL DAYS
SUM OF ALL RESPONSES TO PHQ9 QUESTIONS 1 & 2: 2
2. FEELING DOWN, DEPRESSED OR HOPELESS: SEVERAL DAYS

## 2024-12-31 ASSESSMENT — PAIN - FUNCTIONAL ASSESSMENT
PAIN_FUNCTIONAL_ASSESSMENT: PAINAD (PAIN ASSESSMENT IN ADVANCED DEMENTIA SCALE)
PAIN_FUNCTIONAL_ASSESSMENT: 0-10

## 2024-12-31 ASSESSMENT — PAIN SCALES - GENERAL
PAINLEVEL_OUTOF10: 0 - NO PAIN

## 2024-12-31 NOTE — ED TRIAGE NOTES
Patient from [facility] they called told us about a high NA level other than that no other complaints patient is AandO to self

## 2024-12-31 NOTE — SIGNIFICANT EVENT
Discussed acute stroke findings Natacha jurado who states she is the POA and she does not want MRI but agrees with Echo. She will discuss her wishes with her sister Becki Strickland.    No need to d/w neuro on call, will not . Family may be pursing hospice. Discussed with Dr Ji and michael Stanford

## 2024-12-31 NOTE — DISCHARGE INSTRUCTIONS
Solid consistency: Pureed (IDDSI level 4)  Liquid consistency: Moderately thick (IDDSI 3) / Honey-thick  Medication administration: Crushed, in puree  Compensatory swallow strategies:  - Upright positioning for all PO intake  - Slow rate of intake  - Small bites  - Small sips  - Total assist for feeding

## 2024-12-31 NOTE — PROGRESS NOTES
12/31/24 1548   Discharge Planning   Living Arrangements Other (Comment)  (From [facility])   Support Systems Family members   Type of Residence Nursing home/residential care   Who is requesting discharge planning? Provider   Home or Post Acute Services Post acute facilities (Rehab/SNF/etc)   Type of Post Acute Facility Services Long term care   Expected Discharge Disposition Inter  (Return to [facility], confirmed with niece Natacha.)   Does the patient need discharge transport arranged? Yes   RoundTrip coordination needed? Yes   Has discharge transport been arranged? No   Financial Resource Strain   How hard is it for you to pay for the very basics like food, housing, medical care, and heating? Not very   Housing Stability   In the last 12 months, was there a time when you were not able to pay the mortgage or rent on time? N   In the past 12 months, how many times have you moved where you were living? 0   At any time in the past 12 months, were you homeless or living in a shelter (including now)? N   Transportation Needs   In the past 12 months, has lack of transportation kept you from medical appointments or from getting medications? no   In the past 12 months, has lack of transportation kept you from meetings, work, or from getting things needed for daily living? No   Patient Choice   Provider Choice list and CMS website (https://medicare.gov/care-compare#search) for post-acute Quality and Resource Measure Data were provided and reviewed with: Family   Patient / Family choosing to utilize agency / facility established prior to hospitalization Yes

## 2024-12-31 NOTE — ED PROVIDER NOTES
HPI   Chief Complaint   Patient presents with    Labs Only     High na       Ama is a 89-year-old woman who presents for nursing home with elevated sodium.  Nursing home did not know exactly how high the sodium was just that it was elevated.  Patient has no complaints and was transferred.  She is DNR Comfort Care arrest.  She denies any fever chills cough or cold.  She is found to be slightly hypoxic and placed on 2 L by the nurses upon arrival in the ED.  Past medical history is found in EMR and nursing on paperwork.  Patient is limited historian.              Patient History   Past Medical History:   Diagnosis Date    Benign neoplasm of thyroid gland     Thyroid adenoma    Disease of thyroid gland     Exudative age-related macular degeneration, bilateral, stage unspecified 01/04/2016    Age-related macular degeneration, wet, both eyes    Exudative age-related macular degeneration, left eye, stage unspecified 10/26/2016    Age-related macular degeneration, wet, left eye    Exudative age-related macular degeneration, right eye, stage unspecified 10/26/2016    Age-related macular degeneration, wet, right eye    Hyperopia of right eye with astigmatism and presbyopia 03/11/2024    Hypokalemia 03/11/2024    Non-Hodgkin lymphoma, unspecified, unspecified site     Non-Hodgkin's lymphoma    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    Other conditions influencing health status     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    Other malaise 02/12/2021    Physical deconditioning    Other specified personal risk factors, not elsewhere classified 02/12/2021    At increased risk of exposure to COVID-19 virus    Personal history of other endocrine, nutritional and metabolic disease 06/15/2015    History of hypothyroidism    Personal history of other endocrine, nutritional and metabolic disease     History of thyroid disease    Personal history of other endocrine, nutritional and metabolic disease     History  of hyperlipidemia    Personal history of other endocrine, nutritional and metabolic disease     History of hypothyroidism    Personal history of other mental and behavioral disorders 06/08/2021    History of schizophrenia    Personal history of other mental and behavioral disorders 01/17/2020    History of anxiety    Personal history of other specified conditions 06/21/2020    History of chronic pain    Personal history of other specified conditions 01/17/2020    History of weakness    Personal history of other specified conditions     History of lymphadenopathy    Physical deconditioning 03/11/2024    Pseudophakia, both eyes 03/11/2024    Retinal neovascularization, unspecified, unspecified eye     Choroid neovascularization    Right radial head fracture 03/11/2024    Unspecified macular degeneration     Age-related macular degeneration     Past Surgical History:   Procedure Laterality Date    COLONOSCOPY  08/07/2013    Complete Colonoscopy    MOUTH SURGERY  05/12/2014    Oral Surgery Tooth Extraction    OTHER SURGICAL HISTORY  08/07/2013    Breast Surgery Puncture Aspiration Of Cyst    OTHER SURGICAL HISTORY  08/07/2013    Endotracheal Tube Insertion     Family History   Problem Relation Name Age of Onset    Hypertension Other       Social History     Tobacco Use    Smoking status: Never    Smokeless tobacco: Never   Substance Use Topics    Alcohol use: Not Currently    Drug use: Never       Physical Exam   ED Triage Vitals [12/30/24 2155]   Temperature Heart Rate Respirations BP   36.1 °C (97 °F) 98 16 115/74      Pulse Ox Temp Source Heart Rate Source Patient Position   95 % Temporal Monitor --      BP Location FiO2 (%)     Right arm --       Physical Exam  Vitals reviewed.   Constitutional:       General: She is awake.   HENT:      Head: Normocephalic.      Nose: Nose normal.      Mouth/Throat:      Comments: Dry lips and oral pharynx  Cardiovascular:      Rate and Rhythm: Normal rate and regular rhythm.    Pulmonary:      Effort: Pulmonary effort is normal.      Breath sounds: Normal breath sounds.   Abdominal:      Palpations: Abdomen is soft.   Musculoskeletal:      Cervical back: Normal range of motion.   Skin:     General: Skin is warm.      Capillary Refill: Capillary refill takes less than 2 seconds.   Neurological:      Mental Status: She is alert.      Comments: Cognitive impairment appreciated but patient is pleasant and follows commands answer simple questions           ED Course & MDM   ED Course as of 12/31/24 0847   Mon Dec 30, 2024   2215 Seen in the emergency department for an abnormal sodium level.  Unknown what the level is.  Patient was sent from nursing facility.  Patient is limited historian and has no current complaints. [RZ]   2215 Plan is to work her up with labs and chest x-ray.  Found to have slightly low oxygen level with a cough. [RZ]   2342 Patient was found to have negative swabs.  Sodium was markedly elevated and signs of dehydration were found.  I suspect this is due to dehydration.  Patient given fluids.  Spoke to the hospitalist agrees with hospitalization. [RZ]      ED Course User Index  [RZ] Cristiano Longo MD         Diagnoses as of 12/31/24 0847   Hyperkalemia   SHANE (acute kidney injury) (CMS-[facility])   Dehydration                 No data recorded     [place] Coma Scale Score: 14 (12/31/24 0758 : Daniela Pedersen RN)                           Medical Decision Making      Procedure  Procedures     Cristiano Longo MD  12/31/24 0873

## 2024-12-31 NOTE — PROGRESS NOTES
Speech-Language Pathology    Speech-Language Pathology Clinical Swallow Evaluation    Patient Name: Ama Pierce  MRN: [id number]  : 1930  Today's Date: 24  Start Time: 1135  Stop Time: 1156  Time Calculation (min): 21 min      ASSESSMENT  Impressions:  mod oral phase and mod suspected pharyngeal phase dysphagia based on clinical swallow evaluation.  Silent aspiration cannot be ruled out. Also, given the patient's diminished mental status, without total assist the patient may not sustain nutrition and hydration orally.      No further SLP services recommended at this time due to no capacity to implement exercise and safe swallowing recommendations.     Prognosis: Guarded    PLAN  Recommendations:  Is MBSS recommended? Not at this time.   Solid consistency: Pureed (IDDSI level 4)  Liquid consistency: Moderately thick (IDDSI 3) / Honey-thick  Medication administration: Crushed, in puree  Compensatory swallow strategies:  - Upright positioning for all PO intake  - Slow rate of intake  - Small bites  - Small sips  - Total assist for feeding    Recommended frequency/duration:  Skilled SLP services recommended: No    SUBJECTIVE    PMHx relevant to rehab:   Acute CVA left basal ganglia, hypernatremia, SHANE, hypothyroidism, dementia, palliative vs hospice consult    Chief complaint: Pt was admitted on 24 due to   Chief Complaint   Patient presents with    Labs Only     High na   . She was found to have Hypernatremia. And acute stroke.     Relevant imaging results:  24 Chest x ray:   IMPRESSION:  No definite acute cardiopulmonary disease.  Signed by Sean Nelson    General Visit Information:  SLP Received On: 24  Patient Class: Inpatient  Living Environment: Home  Ordering Physician: Marylou MORSE  Reason for Referral: concern for dysphagia with aspiration  Prior to Session Communication: Bedside nurse    RN cleared pt to participate in session and reported that she ate half of her lunch with  assist and without apparent difficulty.  She coughed after water sips.     Pt was nonverbal. Pleasant, made eye contact, participated in feeding herself juice, but required assist/ spoon feeding for the food on her tray.    [employer] Diet: information is not available in the chart.  Current Diet : pureed with mod thick liquids    Status at time of evaluation:  Pain Assessment  Pain Assessment: PAINAD (Pain Assessment in Advanced Dementia Scale)  0-10 (Numeric) Pain Score: 0 - No pain  Clinical Progression: Not changed    Pt was alert and cooperative for session.  Orientation: not able to answer any orientation questions accurately.   Ability to follow functional commands: Does not follow verbal commands  Nutritional status: Signs of possible malnutrition  Baseline Vocal Quality: Wet, Weak  Volitional Cough: Weak     Patient positioning: Upright as close as possible to 90 degrees, but partially reclined      OBJECTIVE  Clinical swallow evaluation completed and consisted of PO trials (PUREED, NECTAR AND HONEY THICK LIQUIDS).  ORAL PHASE: Dentition in functioning condition. Pt was unable to participate in oral mechanism exam due to limited cognition. No obvious focal weakness was apparent.   PHARYNGEAL PHASE: The patient's voice was soft when she did phonate, and possible wet quality to her voice. Laryngeal elevation was visualized or palpated with all trials, however adequacy of hyolaryngeal elevation/excursion cannot be determined at bedside. Coughing was noted after a delay when sipping IDDSI 2/nectar thick liquids.     Was 3oz challenge administered: No, deferred due to safety concerns.      Treatment/Education:  Results and recommendations were relayed to: NP  Education provided: No   Learner: Patient- no family available at the time of the assessment.    Barriers to learning: Cognitive limitations barrier   Treatment provided: No

## 2024-12-31 NOTE — PROGRESS NOTES
Ama Pierce is a 89 y.o. female on day 0 of admission presenting with Hypernatremia.      Subjective   Denies pain, she is resting but awakens to name. Asking about her dog.        Objective     Last Recorded Vitals  BP (!) 146/94 (Patient Position: Lying)   Pulse 84   Temp 36.5 °C (97.7 °F) (Temporal)   Resp 20   Wt 50.6 kg (111 lb 9.6 oz)   SpO2 95%   Intake/Output last 3 Shifts:    Intake/Output Summary (Last 24 hours) at 12/31/2024 1010  Last data filed at 12/31/2024 0927  Gross per 24 hour   Intake 40 ml   Output --   Net 40 ml       Admission Weight  Weight: 50.6 kg (111 lb 9.6 oz) (12/31/24 0102)    Daily Weight  12/31/24 : 50.6 kg (111 lb 9.6 oz)    Image Results  XR chest 1 view  Narrative: STUDY:  Chest Radiograph;  12/30/24, 10:39pm  INDICATION:  Low oxygen level.  COMPARISON:  XR Chest 1/26/20  ACCESSION NUMBER(S):  [id number]  ORDERING CLINICIAN:  ARBEN CARDENAS  TECHNIQUE:  Frontal chest was obtained at 2239 hours.  FINDINGS:  CARDIOMEDIASTINAL SILHOUETTE:  Cardiomediastinal silhouette is upper normal in size and  configuration.     LUNGS:  Lungs are clear.  Slightly diminished inspiration is noted.     ABDOMEN:  No remarkable upper abdominal findings.     BONES:  No acute osseous changes.  Impression: No definite acute cardiopulmonary disease.  Signed by Sean Nelson      Physical Exam  Physical Exam  Gen: NAD  Eyes:  open  ENT: MM dry  Neck: No JVD  Respiratory: CTAB, no wheezes/rhonchi  Cardiac: RRR, no murmurs rubs or gallops  Abdomen: soft, NT, +BS  Extremities: no edema or cyanosis  Neuro: alert and oriented x 1, NIHSS 6: did not know month or age, minor right sided facial droop, right arm with minimal effort against gravity, left arm drift   Psych:  appropriate mood and behavior      Assessment/Plan   Acute CVA left basal ganglia  -start tele  -CT head resulted this morning  -Current NIHSS 6  -bedside swallow eval prior to diet  -ASA/Statin  -she has allergy to statin, unknown  reaction  -lipid panel, A1C  -Q4H neuro checks  -permissive hypertension  -echo  -PT/OT/SLP  -She is a DNR, will contact POA to update and determine if MRI/MRA is desired  -left message for Natacha Bessykinza    Hypernatremia  - Na 156  - Most likely secondary to dehydration with free water deficit  - IV hydration with hypotonic IVF (D5W)  - Minimal improvement overnight, continue IVF and monitor Na level     SHANE  - Appears to be prerenal  - Likely related to decreased oral intake of fluids  - Conitnue Volume expansion  - Monitor urine output     Hypothyroidism  - Resume levothyroxine  - Check TSH     Dementia  - Supportive care  - baseline oriented x 1, at baseline     Code status  - DNR  - palliative vs hospice consult as she is not swallow easily this morning    Dispo: Full admission for acute stroke with poor oral intake and hypernatremia, continue IVF, PT/OT/SLP consulted. [facility]    Marylou Cartwright PA-C  D/w Dr. Ji

## 2024-12-31 NOTE — PROGRESS NOTES
Pharmacy Medication History Review    Ama Pierce is a 89 y.o. female admitted for Hypernatremia. Pharmacy reviewed the patient's [id number] medications and allergies for accuracy.    PTA Medication List has been verified/updated as appears on:   Medication Review Report from Abby Manuel (12/27/24, 15:39:09 ET).    The list below reflects the updated PTA list.   Prior to Admission Medications   Prescriptions  Facility Reported?   Lactobacillus acidophilus (ACIDOPHILUS ORAL)  Yes   Sig: Take 1 capsule by mouth once daily.   OLANZapine (ZyPREXA) 2.5 mg tablet  Yes   Sig: Take 1 tablet (2.5 mg) by mouth once daily at bedtime.   acetaminophen (Tylenol) 325 mg tablet  Yes   Sig: Take 2 tablets (650 mg) by mouth every 4 hours if needed.  --> every 4 to 6 hours PRN mild to mod pain   bisacodyl (Dulcolax) 10 mg suppository  Yes   Sig: Insert 1 suppository (10 mg) into the rectum 1 time if needed   for constipation (if no results from MOM).   cholecalciferol (Vitamin D3) 1,250 mcg (50,000 unit) tablet  Yes   Sig: Take 1 tablet (50,000 Units) by mouth every 30 (thirty) days.  --> Given on 30th day of month (?)   cranberry extract 425 mg capsule  Yes   Sig: Take 1 capsule by mouth once daily.   cycloSPORINE 0.05 % drops  Yes   Sig: Administer 2 drops into both eyes every 12 hours.   latanoprost (Xalatan) 0.005 % ophthalmic solution  Yes   Sig: Administer 1 drop into the right eye once daily at bedtime.   levothyroxine (LevoxyL) 88 mcg tablet  Yes   Sig: Take 1 tablet (88 mcg) by mouth once daily in the morning.    magnesium hydroxide (Milk of Magnesia) 400 mg/5 mL suspension  Yes   Sig: Take 30 mL by mouth once daily as needed for constipation.   melatonin 1 mg tablet  Yes   Sig: Take 1 tablet (1 mg) by mouth once daily.   nystatin (Mycostatin) 100,000 unit/gram powder  Yes   Sig: Apply 1 Application topically if needed for rash (under breasts).   potassium chloride CR 10 mEq ER tablet  Yes   Sig: Take 1 tablet (10  mEq) by mouth once daily.               The list below reflects the updated allergy list. Please review each documented allergy for additional clarification and justification.  Allergies  Reviewed by Alison Sawant RN on 12/31/2024        Severity Reactions Comments    Atorvastatin Not Specified Unknown     Ephedrine Not Specified Unknown           CHANGED in PTA List:  Florastor --> Acidophilus  Vitamin D 5000 units daily --> 50,000 units every 30 days    ADDED to PTA List:  None    REMOVED from PTA List:  None    ----------------------------------  Haris Bill, PharmD, [facility]  Transitions of Care Pharmacist  Overnight SNF Coverage from [facility]  Medication reconciliation complete  Please reach out via Epic Secure Chat (7p-7a) for questions,   or if no response call [phone].  [facility]

## 2024-12-31 NOTE — H&P
History Of Present Illness  Ama Pierce is a 89 y.o. female, [facility] resident, with history of dementia and paranoid schizophrenia presenting with abnormal labs. Pt is not able to provide any meaningful history. She apparently had [facility] blood work [facility] the [facility] that revealed an elevated Na so they transported her to the ED. She has no specific complaints and there's no report of vomiting, diarrhea, fever or chills. Labs in the ED revealed a Na of 156, and BUN/creat 58/1.35     Past Medical History  Past Medical History:   Diagnosis Date    Benign neoplasm of thyroid gland     Thyroid adenoma    Disease of thyroid gland     Exudative age-related macular degeneration, bilateral, stage unspecified 01/04/2016    Age-related macular degeneration, wet, both eyes    Exudative age-related macular degeneration, left eye, stage unspecified 10/26/2016    Age-related macular degeneration, wet, left eye    Exudative age-related macular degeneration, right eye, stage unspecified 10/26/2016    Age-related macular degeneration, wet, right eye    Hyperopia of right eye with astigmatism and presbyopia 03/11/2024    Hypokalemia 03/11/2024    Non-Hodgkin lymphoma, unspecified, unspecified site     Non-Hodgkin's lymphoma    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    Other conditions influencing health status     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    Other malaise 02/12/2021    Physical deconditioning    Other specified personal risk factors, not elsewhere classified 02/12/2021    At increased risk of exposure to COVID-19 virus    Personal history of other endocrine, nutritional and metabolic disease 06/15/2015    History of hypothyroidism    Personal history of other endocrine, nutritional and metabolic disease     History of thyroid disease    Personal history of other endocrine, nutritional and metabolic disease     History of hyperlipidemia    Personal history of other endocrine, nutritional and  metabolic disease     History of hypothyroidism    Personal history of other mental and behavioral disorders 06/08/2021    History of schizophrenia    Personal history of other mental and behavioral disorders 01/17/2020    History of anxiety    Personal history of other specified conditions 06/21/2020    History of chronic pain    Personal history of other specified conditions 01/17/2020    History of weakness    Personal history of other specified conditions     History of lymphadenopathy    Physical deconditioning 03/11/2024    Pseudophakia, both eyes 03/11/2024    Retinal neovascularization, unspecified, unspecified eye     Choroid neovascularization    Right radial head fracture 03/11/2024    Unspecified macular degeneration     Age-related macular degeneration       Past Surgical History  Past Surgical History:   Procedure Laterality Date    COLONOSCOPY  08/07/2013    Complete Colonoscopy    MOUTH SURGERY  05/12/2014    Oral Surgery Tooth Extraction    OTHER SURGICAL HISTORY  08/07/2013    Breast Surgery Puncture Aspiration Of Cyst    OTHER SURGICAL HISTORY  08/07/2013    Endotracheal Tube Insertion        Social History  She reports that she has never smoked. She has never used smokeless tobacco. She reports that she does not currently use alcohol. She reports that she does not use drugs.    Family History  Family History   Problem Relation Name Age of Onset    Hypertension Other          Allergies  Atorvastatin and Ephedrine    Medications  No current facility-administered medications on file prior to encounter.     Current Outpatient Medications on File Prior to Encounter   Medication Sig Dispense Refill    acetaminophen (Tylenol) 325 mg tablet Take 2 tablets (650 mg) by mouth every 4 hours if needed.      bisacodyl (Dulcolax) 10 mg suppository Insert 1 suppository (10 mg) into the rectum 1 time if needed for constipation (if no results from MOM).      cholecalciferol (Vitamin D3) 1,250 mcg (50,000 unit)  tablet Take 1 tablet (50,000 Units) by mouth every 30 (thirty) days.      cranberry extract 425 mg capsule Take 1 capsule by mouth once daily.      cycloSPORINE 0.05 % drops Administer 2 drops into both eyes every 12 hours.      Lactobacillus acidophilus (ACIDOPHILUS ORAL) Take 1 capsule by mouth once daily.      latanoprost (Xalatan) 0.005 % ophthalmic solution Administer 1 drop into the right eye once daily at bedtime.      levothyroxine (LevoxyL) 88 mcg tablet Take 1 tablet (88 mcg) by mouth once daily in the morning. Take before meals.      magnesium hydroxide (Milk of Magnesia) 400 mg/5 mL suspension Take 30 mL by mouth once daily as needed for constipation.      melatonin 1 mg tablet Take 1 tablet (1 mg) by mouth once daily.      nystatin (Mycostatin) 100,000 unit/gram powder Apply 1 Application topically if needed for rash (under breasts).      OLANZapine (ZyPREXA) 2.5 mg tablet Take 1 tablet (2.5 mg) by mouth once daily at bedtime.      potassium chloride CR 10 mEq ER tablet Take 1 tablet (10 mEq) by mouth once daily. Do not crush, chew, or split.      [DISCONTINUED] cholecalciferol (Vitamin D-3) 125 MCG (5000 UT) capsule Take 1 capsule (125 mcg) by mouth once daily.      [DISCONTINUED] saccharomyces boulardii (Florastor) 250 mg capsule Take 1 capsule (250 mg) by mouth once daily.         Review of Systems   Reason unable to perform ROS: Pt with dementia.   Constitutional:  Negative for fever.   Respiratory:  Negative for shortness of breath.    Cardiovascular:  Negative for chest pain.   Gastrointestinal:  Negative for abdominal pain and diarrhea.   Musculoskeletal:  Negative for back pain.   Psychiatric/Behavioral:  Positive for confusion.         Physical Exam  Constitutional:       General: She is not in acute distress.     Appearance: She is not ill-appearing.      Comments: Elderly female who is disoriented   HENT:      Head: Normocephalic and atraumatic.      Right Ear: External ear normal.      Left  Ear: External ear normal.      Mouth/Throat:      Mouth: Mucous membranes are dry.      Pharynx: No oropharyngeal exudate or posterior oropharyngeal erythema.   Eyes:      General: No scleral icterus.        Right eye: No discharge.         Left eye: No discharge.   Cardiovascular:      Rate and Rhythm: Normal rate and regular rhythm.      Heart sounds: No murmur heard.  Pulmonary:      Breath sounds: No wheezing, rhonchi or rales.   Abdominal:      Palpations: Abdomen is soft. There is no mass.      Tenderness: There is no abdominal tenderness.   Musculoskeletal:      Cervical back: Neck supple.      Right lower leg: No edema.      Left lower leg: No edema.   Lymphadenopathy:      Cervical: No cervical adenopathy.   Skin:     General: Skin is warm and dry.   Neurological:      Mental Status: She is alert. She is disoriented.      Motor: Weakness present.      Comments: Right arm weakness   Psychiatric:         Mood and Affect: Mood normal.         Behavior: Behavior normal.          Last Recorded Vitals  Blood pressure 128/80, pulse 92, temperature 36.3 °C (97.3 °F), temperature source Temporal, resp. rate 14, height 1.524 m (5'), weight 50.6 kg (111 lb 9.6 oz), SpO2 96%.    Relevant Results   Latest Reference Range & Units 12/30/24 22:26   GLUCOSE 74 - 99 mg/dL 106 (H)   SODIUM 136 - 145 mmol/L 156 (H)   POTASSIUM 3.5 - 5.3 mmol/L 3.8   CHLORIDE 98 - 107 mmol/L 113 (H)   Bicarbonate 21 - 32 mmol/L 26   Anion Gap 10 - 20 mmol/L 21 (H)   Blood Urea Nitrogen 6 - 23 mg/dL 58 (H)   Creatinine 0.50 - 1.05 mg/dL 1.35 (H)   EGFR >60 mL/min/1.73m*2 36 (L)   Calcium 8.6 - 10.3 mg/dL 9.6   PHOSPHORUS 2.5 - 4.9 mg/dL 4.0   Albumin 3.4 - 5.0 g/dL 4.1   Alkaline Phosphatase 33 - 136 U/L 79   ALT 7 - 45 U/L 61 (H)   AST 9 - 39 U/L 62 (H)   Bilirubin Total 0.0 - 1.2 mg/dL 1.2   Total Protein 6.4 - 8.2 g/dL 8.1   MAGNESIUM 1.60 - 2.40 mg/dL 2.68 (H)   WBC 4.4 - 11.3 x10*3/uL 11.5 (H)   nRBC 0.0 - 0.0 /100 WBCs 0.0   RBC 4.00 -  5.20 x10*6/uL 5.72 (H)   HEMOGLOBIN 12.0 - 16.0 g/dL 16.3 (H)   HEMATOCRIT 36.0 - 46.0 % 50.1 (H)   MCV 80 - 100 fL 88   MCH 26.0 - 34.0 pg 28.5   MCHC 32.0 - 36.0 g/dL 32.5   RED CELL DISTRIBUTION WIDTH 11.5 - 14.5 % 15.0 (H)   Platelets 150 - 450 x10*3/uL 217   Neutrophils % 40.0 - 80.0 % 83.5   Immature Granulocytes %, Automated 0.0 - 0.9 % 1.0 (H)   Lymphocytes % 13.0 - 44.0 % 7.3   Monocytes % 2.0 - 10.0 % 7.6   Eosinophils % 0.0 - 6.0 % 0.1   Basophils % 0.0 - 2.0 % 0.5   Neutrophils Absolute 1.60 - 5.50 x10*3/uL 9.55 (H)   Immature Granulocytes Absolute, Automated 0.00 - 0.50 x10*3/uL 0.12   Lymphocytes Absolute 0.80 - 3.00 x10*3/uL 0.84   Monocytes Absolute 0.05 - 0.80 x10*3/uL 0.87 (H)   Eosinophils Absolute 0.00 - 0.40 x10*3/uL 0.01   Basophils Absolute 0.00 - 0.10 x10*3/uL 0.06   Flu A Result Not Detected  Not Detected   Flu B Result Not Detected  Not Detected   RSV PCR Not Detected  Not Detected   Coronavirus 2019, PCR Not Detected  Not Detected   (H): Data is abnormally high  (L): Data is abnormally low    XR CHEST:     IMPRESSION:  No definite acute cardiopulmonary disease.     Assessment/Plan   Hypernatremia  - Na 156  - Most likely secondary to dehydration with free water deficit  - IV hydration with hypotonic IVF (D5W)  - Monitor Na level    SHANE  - Appears to be prerenal  - Likely related to decreased oral intake of fluids  - Volume expansion  - Monitor urine output    Right arm weakness  - Unknown duration and unknown if pt had a CVA  - Will check CT head without contrast    Hypothyroidism  - Resume levothyroxine  - Check TSH    Dementia  - Supportive care    Code status  - DNR    I spent 75 minutes in the professional and overall care of this patient.      Maximo Decker MD

## 2024-12-31 NOTE — CONSULTS
Nutrition Note:   Nutrition Assessment       Patient is a 89 y.o. female presenting with acute CVA left basal ganglia.    RDN received nursing admission screen (MST=2). Pt transitioned to comfort measures only this morning. Palliative consult for hospice evaluation pending. Will defer full assessment as advanced medical nutrition therapy is not indicated. Please re-consult if goals of care change.       Dietary Orders (From admission, onward)       Start     Ordered    12/31/24 1050  Adult diet Regular; Pureed 4; Moderately thick 3  Diet effective now        Question Answer Comment   Diet type Regular    Texture Pureed 4    Fluid consistency Moderately thick 3        12/31/24 1049    12/31/24 0132  May Participate in Room Service With Assistance  ( ROOM SERVICE MAY PARTICIPATE WITH ASSISTANCE)  Once        Question:  .  Answer:  Yes    12/31/24 0131               Time Spent (min): 15 minutes

## 2024-12-31 NOTE — CONSULTS
Consults    PALLIATIVE CARE SOCIAL WORK CONSULT:  Hien GONZALEZ  CURRENT ATTENDING PROVIDER: Lauren Ji DO     Reason for Consult    GOC    Introduction to Palliative Care  Pt unable to participate in visit.  Spoke with pt's rhiannon Manzano  Staff present:  Hien Werner  Palliative care was introduced as a service for patients with serious illness to help with symptoms, assist with goals of care conversations, navigate complex decision making, improve quality of life for patients, and provide support to patients and families.  Support and empathy was provided throughout the encounter.    History of Present Illness  Ama Pierce is a 89 y.o. female with past medical history of dementia and paranoid schizophrenia. Pt is presenting with abnormal labs.    Caregiving/Caregiver Support  Does the patient require assistance in some or all components of her care, including coordination of medical care?  yes  If Yes, which person serves that role?  NH    Medical History  Per EMR    Personal History  Pt is single.  Pt is a resident at [facility].    Cheondoism and Importance of Cheondoism:  Yazidism    Depression   per EMR    Anxiety  per EMR      Advance Directives  Surrogate Health Care Decision Maker:  rhiannon Manzano  HCPOA   [facility] documents were updated since 2009, copies at [facility]  Living Will  [facility] documents were updated since 2009, copies at [facility]    Code Status                    DNR.  Discussed additional DNR options.  Rhiannon would like DNRCC.     Serious Illness Conversation        Life Limiting Disease:  multiple  Disease Specific Information Provided:  overall poor prognosis, waiting for SLP eval.   What is your understanding now of where you are with your illness:  rhiannon states understanding  What are your fears, worries, or concerns about the future:   none expressed  What are you hoping for:  peaceful death  How much does your family know about your priorities and wishes:  rhiannon  states pt has always maintained comfort care at end of life    Other distressing Symptoms        Pt is at risk of aspiration.  SLP evaluated and recommending purred with honey thick liquids.  Pleasure feeds explained, niece would like to continue with SLP recommendations.     Plan and Social Considerations  Discussed hospice care.  Niece in agreement.  Reviewed options.  Family would like [facility].  Niece can esign consents.  Reviewed plan for pt to return to [facility] with hospice.    [facility]     Plan of Care discussed with: team    Thank you for asking Palliative Care to assist with care of this patient.  We will continue to follow  Please contact us for additional questions or concerns.    LISA Patel  Palliative Care   Contact Via Epic Secure chat    Negative

## 2025-01-01 VITALS
SYSTOLIC BLOOD PRESSURE: 139 MMHG | HEART RATE: 82 BPM | WEIGHT: 111.6 LBS | OXYGEN SATURATION: 93 % | BODY MASS INDEX: 21.91 KG/M2 | DIASTOLIC BLOOD PRESSURE: 83 MMHG | HEIGHT: 60 IN | TEMPERATURE: 96.6 F | RESPIRATION RATE: 15 BRPM

## 2025-01-01 LAB
ANION GAP SERPL CALC-SCNC: 14 MMOL/L (ref 10–20)
BUN SERPL-MCNC: 28 MG/DL (ref 6–23)
CALCIUM SERPL-MCNC: 8.1 MG/DL (ref 8.6–10.3)
CHLORIDE SERPL-SCNC: 111 MMOL/L (ref 98–107)
CO2 SERPL-SCNC: 25 MMOL/L (ref 21–32)
CREAT SERPL-MCNC: 0.88 MG/DL (ref 0.5–1.05)
EGFRCR SERPLBLD CKD-EPI 2021: 61 ML/MIN/1.73M*2
GLUCOSE BLD MANUAL STRIP-MCNC: 105 MG/DL (ref 74–99)
GLUCOSE BLD MANUAL STRIP-MCNC: 85 MG/DL (ref 74–99)
GLUCOSE SERPL-MCNC: 105 MG/DL (ref 74–99)
POTASSIUM SERPL-SCNC: 3.1 MMOL/L (ref 3.5–5.3)
SODIUM SERPL-SCNC: 147 MMOL/L (ref 136–145)

## 2025-01-01 PROCEDURE — 2500000002 HC RX 250 W HCPCS SELF ADMINISTERED DRUGS (ALT 637 FOR MEDICARE OP, ALT 636 FOR OP/ED): Performed by: INTERNAL MEDICINE

## 2025-01-01 PROCEDURE — 99239 HOSP IP/OBS DSCHRG MGMT >30: CPT | Performed by: INTERNAL MEDICINE

## 2025-01-01 PROCEDURE — 82947 ASSAY GLUCOSE BLOOD QUANT: CPT

## 2025-01-01 PROCEDURE — 2500000001 HC RX 250 WO HCPCS SELF ADMINISTERED DRUGS (ALT 637 FOR MEDICARE OP): Performed by: INTERNAL MEDICINE

## 2025-01-01 PROCEDURE — 2500000004 HC RX 250 GENERAL PHARMACY W/ HCPCS (ALT 636 FOR OP/ED): Performed by: PHYSICIAN ASSISTANT

## 2025-01-01 PROCEDURE — 2500000001 HC RX 250 WO HCPCS SELF ADMINISTERED DRUGS (ALT 637 FOR MEDICARE OP): Performed by: PHYSICIAN ASSISTANT

## 2025-01-01 PROCEDURE — 2500000004 HC RX 250 GENERAL PHARMACY W/ HCPCS (ALT 636 FOR OP/ED): Performed by: INTERNAL MEDICINE

## 2025-01-01 PROCEDURE — 80048 BASIC METABOLIC PNL TOTAL CA: CPT | Performed by: PHYSICIAN ASSISTANT

## 2025-01-01 PROCEDURE — 36415 COLL VENOUS BLD VENIPUNCTURE: CPT | Performed by: PHYSICIAN ASSISTANT

## 2025-01-01 RX ORDER — NAPROXEN SODIUM 220 MG/1
81 TABLET, FILM COATED ORAL DAILY
Start: 2025-01-02

## 2025-01-01 RX ORDER — CLOPIDOGREL BISULFATE 75 MG/1
75 TABLET ORAL DAILY
Start: 2025-01-02

## 2025-01-01 RX ADMIN — ASPIRIN 81 MG 81 MG: 81 TABLET ORAL at 08:05

## 2025-01-01 RX ADMIN — Medication 1 CAPSULE: at 08:05

## 2025-01-01 RX ADMIN — CLOPIDOGREL 75 MG: 75 TABLET ORAL at 08:06

## 2025-01-01 RX ADMIN — Medication 250 MG: at 08:06

## 2025-01-01 RX ADMIN — CYCLOSPORINE 1 DROP: 0.5 EMULSION OPHTHALMIC at 05:56

## 2025-01-01 RX ADMIN — Medication 2000 UNITS: at 08:05

## 2025-01-01 RX ADMIN — ENOXAPARIN SODIUM 30 MG: 100 INJECTION SUBCUTANEOUS at 08:05

## 2025-01-01 RX ADMIN — POLYETHYLENE GLYCOL 3350 17 G: 17 POWDER, FOR SOLUTION ORAL at 08:05

## 2025-01-01 RX ADMIN — LEVOTHYROXINE SODIUM 88 MCG: 0.09 TABLET ORAL at 06:00

## 2025-01-01 ASSESSMENT — COGNITIVE AND FUNCTIONAL STATUS - GENERAL
EATING MEALS: A LITTLE
DRESSING REGULAR LOWER BODY CLOTHING: A LOT
WALKING IN HOSPITAL ROOM: TOTAL
PERSONAL GROOMING: A LOT
MOVING FROM LYING ON BACK TO SITTING ON SIDE OF FLAT BED WITH BEDRAILS: A LOT
MOVING TO AND FROM BED TO CHAIR: A LOT
TURNING FROM BACK TO SIDE WHILE IN FLAT BAD: A LITTLE
CLIMB 3 TO 5 STEPS WITH RAILING: TOTAL
DAILY ACTIVITIY SCORE: 12
MOBILITY SCORE: 11
STANDING UP FROM CHAIR USING ARMS: A LOT
TOILETING: TOTAL
DRESSING REGULAR UPPER BODY CLOTHING: A LOT
HELP NEEDED FOR BATHING: A LOT

## 2025-01-01 ASSESSMENT — PAIN SCALES - GENERAL: PAINLEVEL_OUTOF10: 0 - NO PAIN

## 2025-01-01 NOTE — PROGRESS NOTES
01/01/25 1029   Discharge Planning   Expected Discharge Disposition Inter  (Return to [facility] with [facility], hospice nurse arranging dc.)   Patient Choice   Provider Choice list and CMS website (https://medicare.gov/care-compare#search) for post-acute Quality and Resource Measure Data were provided and reviewed with: Family   Patient / Family choosing to utilize agency / facility established prior to hospitalization Yes   Intensity of Service   Intensity of Service 0-30 min

## 2025-01-01 NOTE — CARE PLAN
Problem: General Stroke  Goal: Maintain BP within ordered limits throughout shift  Outcome: Progressing  Goal: Controlled blood glucose throughout shift  Outcome: Progressing   The patient's goals for the shift include  unable to assess.     The clinical goals for the shift include patient will remain safe during shift

## 2025-01-01 NOTE — DISCHARGE SUMMARY
[facility] Hospitalist  Discharge Summary with Discharge Day Progress Note and Transition Note                 Ama Pierce                  : 1930                      MRN:  [id number]     ADMIT DATE: 2024            DISCHARGE DATE:  2025     PRIMARYCARE PHYSICIAN:  Crista Castanon MD     VISIT STATUS: Admission     CODE STATUS:  DNR Comfort Measures Only     DISCHARGE DIAGNOSES:    Assessment & Plan  Hypernatremia    Hyperkalemia       HOSPITAL COURSE:  Ama Pierce is a 89 y.o. female, ECF resident, with history of dementia and paranoid schizophrenia presenting with hypernatremia from facility following routine blood work up. She has no specific complaints and there's no report of vomiting, diarrhea, fever or chills. Labs in the ED revealed a Na of 156, and BUN/creat 58/1.35. During admission team evaluation patient was noted to have RUE weakness. CT head revealed acute left basal ganglia stroke. Family opted to make patient DNR/CCO with plans to transition to ECF with hospice. Patient was discharged to facility once medically optimized.    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  #Paranoid schizophrenia  #Hypothyroidism       PROCEDURES:  none  CONSULTANTS:  none    COMPLEXITY OF FOLLOW UP:  [] Moderate Complexity: follow up within 7-14 calendar days (29851)  []Severe Complexity: follow up within 7 calendar days (40240)     FOLLOW UP TESTING, PENDING RESULTS ORREFERRALS AT TRANSITIONAL CARE VISIT:   []Yes   [] No    DAY OF DISCHARGE:  Review of Systems   Unable to perform ROS: Dementia        Patient Vitals for the past 24 hrs:   BP Temp Temp src Pulse Resp SpO2   25 0734 127/73 36.2 °C (97.2 °F) Temporal 85 15 90 %   25 0349 131/76 36.4 °C (97.5 °F) -- 90 16 94 %   24 2357 140/81 36.9 °C (98.4 °F) -- 92 16 94 %   24 -- -- -- -- -- 94 %   24 (!) 163/92 36.8 °C (98.2 °F)  -- 94 20 93 %   24 1627 112/70 37.5 °C (99.5 °F) Temporal 87 26 91 %        Average, Min, and Max forlast 24 hours Vitals:  TEMPERATURE:  Temp  Av.8 °C (98.2 °F)  Min: 36.2 °C (97.2 °F)  Max: 37.5 °C (99.5 °F)     RESPIRATIONS RANGE: Resp  Av.6  Min: 15  Max: 26     PULSE RANGE: Pulse  Av.6  Min: 85  Max: 94     BLOOD PRESSURE RANGE:  Systolic (24hrs), Av , Min:112 , Max:163   ; Diastolic (24hrs), Av, Min:70, Max:92       PULSE OXIMETRYRANGE: SpO2  Av.7 %  Min: 90 %  Max: 94 %  Body mass index is 21.8 kg/m².     I/O last 3 completed shifts:  In: 100 (2 mL/kg) [P.O.:100]  Out: 400 (7.9 mL/kg) [Urine:400 (0.2 mL/kg/hr)]  Weight: 50.6 kg      Physical Exam  Vitals and nursing note reviewed.   Constitutional:       Appearance: Normal appearance.   HENT:      Head: Normocephalic and atraumatic.      Right Ear: External ear normal.      Left Ear: External ear normal.      Nose: Nose normal.   Cardiovascular:      Rate and Rhythm: Normal rate and regular rhythm.   Pulmonary:      Effort: Pulmonary effort is normal.      Breath sounds: Normal breath sounds.   Abdominal:      General: Abdomen is flat. Bowel sounds are normal.      Palpations: Abdomen is soft.   Musculoskeletal:         General: Normal range of motion.      Right lower leg: No edema.      Left lower leg: No edema.   Skin:     General: Skin is warm and dry.      Capillary Refill: Capillary refill takes less than 2 seconds.   Neurological:      General: No focal deficit present.      Mental Status: She is lethargic.      Comments: Limited due to lack of cooperation from patient   Psychiatric:         Attention and Perception: She is inattentive.         Mood and Affect: Affect is flat.         Speech: She is noncommunicative.         Behavior: Behavior is slowed.         Cognition and Memory: Cognition is impaired. Memory is impaired.           DISCHARGE MEDICATIONS:    Significant Medication Changes:         Your medication  list        START taking these medications        Instructions Last Dose Given Next Dose Due   aspirin 81 mg chewable tablet  Start taking on: January 2, 2025      Chew 1 tablet (81 mg) once daily.       clopidogrel 75 mg tablet  Commonly known as: Plavix  Start taking on: January 2, 2025      Take 1 tablet (75 mg) by mouth once daily.              CONTINUE taking these medications        Instructions Last Dose Given Next Dose Due   acetaminophen 325 mg tablet  Commonly known as: Tylenol           bisacodyl 10 mg suppository  Commonly known as: Dulcolax           cycloSPORINE 0.05 % drops           latanoprost 0.005 % ophthalmic solution  Commonly known as: Xalatan           LevoxyL 88 mcg tablet  Generic drug: levothyroxine           magnesium hydroxide 400 mg/5 mL suspension  Commonly known as: Milk of Magnesia           melatonin 1 mg tablet           nystatin 100,000 unit/gram powder  Commonly known as: Mycostatin           OLANZapine 2.5 mg tablet  Commonly known as: ZyPREXA                  STOP taking these medications      ACIDOPHILUS ORAL        cholecalciferol 1,250 mcg (50,000 unit) tablet  Commonly known as: Vitamin D3        cranberry extract 425 mg capsule        potassium chloride CR 10 mEq ER tablet  Commonly known as: Klor-Con                  Where to Get Your Medications        Information about where to get these medications is not yet available    Ask your nurse or doctor about these medications  aspirin 81 mg chewable tablet  clopidogrel 75 mg tablet            DIET:  pleasure feeds      DISPOSITION:  ECF with hospice     Follow up with Crista Catsanon MD  .    DISCHARGE TIME: > 30 minutes    Electronically signed by Lauren Ji DO on 01/01/25 at 9:57 AM    Dictated using Dragon Naturally Speaking Medical Version 2.4  Proof read however unrecognized voice recognition errors may have occurred

## 2025-01-01 NOTE — CARE PLAN
The patient's goals for the shift include      The clinical goals for the shift include patient remain safe during shift    Problem: General Stroke  Goal: Establish a mutual long term goal with patient by discharge  1/1/2025 0748 by Mejia Gates RN  Outcome: Progressing  1/1/2025 0748 by Mejia Gates RN  Outcome: Progressing     Problem: General Stroke  Goal: Maintain BP within ordered limits throughout shift  1/1/2025 0748 by Mejia Gates RN  Outcome: Progressing  1/1/2025 0748 by Mejia Gates RN  Outcome: Progressing     Problem: Skin  Goal: Decreased wound size/increased tissue granulation at next dressing change  Outcome: Progressing  Flowsheets (Taken 1/1/2025 0748)  Decreased wound size/increased tissue granulation at next dressing change: Promote sleep for wound healing     Problem: Skin  Goal: Participates in plan/prevention/treatment measures  Outcome: Progressing  Flowsheets (Taken 1/1/2025 0748)  Participates in plan/prevention/treatment measures: Elevate heels

## 2025-01-01 NOTE — NURSING NOTE
Discharge instructions reviewed with patient. No questions at this time. Education given for new homegoing medications with type, uses, and most common side effects. Patient verbalized understanding.  Telemetry removed and left at nurses station. IV removed. Discharged judd hill in stable condition. Hospice nurse talked with family and called judd brown to give them report. Gave report to ems.

## 2025-01-02 LAB — BACTERIA UR CULT: ABNORMAL

## 2025-01-08 LAB
ATRIAL RATE: 101 BPM
P AXIS: 47 DEGREES
P OFFSET: 206 MS
P ONSET: 145 MS
PR INTERVAL: 140 MS
Q ONSET: 215 MS
QRS COUNT: 17 BEATS
QRS DURATION: 110 MS
QT INTERVAL: 398 MS
QTC CALCULATION(BAZETT): 516 MS
QTC FREDERICIA: 473 MS
R AXIS: -39 DEGREES
T AXIS: 144 DEGREES
T OFFSET: 414 MS
VENTRICULAR RATE: 101 BPM